# Patient Record
Sex: FEMALE | Race: BLACK OR AFRICAN AMERICAN | NOT HISPANIC OR LATINO
[De-identification: names, ages, dates, MRNs, and addresses within clinical notes are randomized per-mention and may not be internally consistent; named-entity substitution may affect disease eponyms.]

---

## 2019-03-13 ENCOUNTER — ASOB RESULT (OUTPATIENT)
Age: 34
End: 2019-03-13

## 2019-03-13 ENCOUNTER — APPOINTMENT (OUTPATIENT)
Age: 34
End: 2019-03-13
Payer: COMMERCIAL

## 2019-03-13 PROBLEM — Z00.00 ENCOUNTER FOR PREVENTIVE HEALTH EXAMINATION: Status: ACTIVE | Noted: 2019-03-13

## 2019-03-13 PROCEDURE — 76813 OB US NUCHAL MEAS 1 GEST: CPT

## 2021-01-14 ENCOUNTER — APPOINTMENT (OUTPATIENT)
Dept: ULTRASOUND IMAGING | Facility: IMAGING CENTER | Age: 36
End: 2021-01-14
Payer: COMMERCIAL

## 2021-01-14 ENCOUNTER — OUTPATIENT (OUTPATIENT)
Dept: OUTPATIENT SERVICES | Facility: HOSPITAL | Age: 36
LOS: 1 days | End: 2021-01-14
Payer: COMMERCIAL

## 2021-01-14 ENCOUNTER — APPOINTMENT (OUTPATIENT)
Dept: MAMMOGRAPHY | Facility: IMAGING CENTER | Age: 36
End: 2021-01-14
Payer: COMMERCIAL

## 2021-01-14 DIAGNOSIS — Z00.8 ENCOUNTER FOR OTHER GENERAL EXAMINATION: ICD-10-CM

## 2021-01-14 PROCEDURE — 77063 BREAST TOMOSYNTHESIS BI: CPT | Mod: 26

## 2021-01-14 PROCEDURE — 77067 SCR MAMMO BI INCL CAD: CPT | Mod: 26

## 2021-01-14 PROCEDURE — 76641 ULTRASOUND BREAST COMPLETE: CPT | Mod: 26,50

## 2021-01-14 PROCEDURE — 76641 ULTRASOUND BREAST COMPLETE: CPT

## 2021-01-14 PROCEDURE — 77063 BREAST TOMOSYNTHESIS BI: CPT

## 2021-01-14 PROCEDURE — 77067 SCR MAMMO BI INCL CAD: CPT

## 2021-02-25 ENCOUNTER — RESULT REVIEW (OUTPATIENT)
Age: 36
End: 2021-02-25

## 2021-09-09 ENCOUNTER — EMERGENCY (EMERGENCY)
Facility: HOSPITAL | Age: 36
LOS: 1 days | Discharge: HOME | End: 2021-09-09
Admitting: EMERGENCY MEDICINE
Payer: COMMERCIAL

## 2021-09-09 VITALS
TEMPERATURE: 97 F | SYSTOLIC BLOOD PRESSURE: 138 MMHG | OXYGEN SATURATION: 100 % | DIASTOLIC BLOOD PRESSURE: 74 MMHG | RESPIRATION RATE: 18 BRPM | HEART RATE: 75 BPM

## 2021-09-09 LAB
ALBUMIN SERPL ELPH-MCNC: 4.5 G/DL — SIGNIFICANT CHANGE UP (ref 3.3–5)
ALP SERPL-CCNC: 54 U/L — SIGNIFICANT CHANGE UP (ref 40–120)
ALT FLD-CCNC: 11 U/L — SIGNIFICANT CHANGE UP (ref 4–33)
ANION GAP SERPL CALC-SCNC: 14 MMOL/L — SIGNIFICANT CHANGE UP (ref 7–14)
APPEARANCE UR: ABNORMAL
APTT BLD: 29.1 SEC — SIGNIFICANT CHANGE UP (ref 27–36.3)
AST SERPL-CCNC: 15 U/L — SIGNIFICANT CHANGE UP (ref 4–32)
BACTERIA # UR AUTO: ABNORMAL
BASOPHILS # BLD AUTO: 0.04 K/UL — SIGNIFICANT CHANGE UP (ref 0–0.2)
BASOPHILS NFR BLD AUTO: 0.5 % — SIGNIFICANT CHANGE UP (ref 0–2)
BILIRUB SERPL-MCNC: <0.2 MG/DL — SIGNIFICANT CHANGE UP (ref 0.2–1.2)
BILIRUB UR-MCNC: NEGATIVE — SIGNIFICANT CHANGE UP
BLD GP AB SCN SERPL QL: NEGATIVE — SIGNIFICANT CHANGE UP
BUN SERPL-MCNC: 5 MG/DL — LOW (ref 7–23)
CALCIUM SERPL-MCNC: 9.5 MG/DL — SIGNIFICANT CHANGE UP (ref 8.4–10.5)
CHLORIDE SERPL-SCNC: 99 MMOL/L — SIGNIFICANT CHANGE UP (ref 98–107)
CO2 SERPL-SCNC: 22 MMOL/L — SIGNIFICANT CHANGE UP (ref 22–31)
COLOR SPEC: YELLOW — SIGNIFICANT CHANGE UP
CREAT SERPL-MCNC: 0.56 MG/DL — SIGNIFICANT CHANGE UP (ref 0.5–1.3)
DIFF PNL FLD: ABNORMAL
EOSINOPHIL # BLD AUTO: 0.21 K/UL — SIGNIFICANT CHANGE UP (ref 0–0.5)
EOSINOPHIL NFR BLD AUTO: 2.8 % — SIGNIFICANT CHANGE UP (ref 0–6)
EPI CELLS # UR: 11 /HPF — HIGH (ref 0–5)
GLUCOSE SERPL-MCNC: 91 MG/DL — SIGNIFICANT CHANGE UP (ref 70–99)
GLUCOSE UR QL: NEGATIVE — SIGNIFICANT CHANGE UP
HCG SERPL-ACNC: SIGNIFICANT CHANGE UP MIU/ML
HCT VFR BLD CALC: 33.9 % — LOW (ref 34.5–45)
HGB BLD-MCNC: 11.6 G/DL — SIGNIFICANT CHANGE UP (ref 11.5–15.5)
HYALINE CASTS # UR AUTO: 2 /LPF — SIGNIFICANT CHANGE UP (ref 0–7)
IANC: 3.52 K/UL — SIGNIFICANT CHANGE UP (ref 1.5–8.5)
IMM GRANULOCYTES NFR BLD AUTO: 0.3 % — SIGNIFICANT CHANGE UP (ref 0–1.5)
INR BLD: 1.02 RATIO — SIGNIFICANT CHANGE UP (ref 0.88–1.16)
KETONES UR-MCNC: NEGATIVE — SIGNIFICANT CHANGE UP
LEUKOCYTE ESTERASE UR-ACNC: NEGATIVE — SIGNIFICANT CHANGE UP
LYMPHOCYTES # BLD AUTO: 3.23 K/UL — SIGNIFICANT CHANGE UP (ref 1–3.3)
LYMPHOCYTES # BLD AUTO: 42.8 % — SIGNIFICANT CHANGE UP (ref 13–44)
MCHC RBC-ENTMCNC: 30 PG — SIGNIFICANT CHANGE UP (ref 27–34)
MCHC RBC-ENTMCNC: 34.2 GM/DL — SIGNIFICANT CHANGE UP (ref 32–36)
MCV RBC AUTO: 87.6 FL — SIGNIFICANT CHANGE UP (ref 80–100)
MONOCYTES # BLD AUTO: 0.53 K/UL — SIGNIFICANT CHANGE UP (ref 0–0.9)
MONOCYTES NFR BLD AUTO: 7 % — SIGNIFICANT CHANGE UP (ref 2–14)
NEUTROPHILS # BLD AUTO: 3.52 K/UL — SIGNIFICANT CHANGE UP (ref 1.8–7.4)
NEUTROPHILS NFR BLD AUTO: 46.6 % — SIGNIFICANT CHANGE UP (ref 43–77)
NITRITE UR-MCNC: NEGATIVE — SIGNIFICANT CHANGE UP
NRBC # BLD: 0 /100 WBCS — SIGNIFICANT CHANGE UP
NRBC # FLD: 0 K/UL — SIGNIFICANT CHANGE UP
PH UR: 7.5 — SIGNIFICANT CHANGE UP (ref 5–8)
PLATELET # BLD AUTO: 247 K/UL — SIGNIFICANT CHANGE UP (ref 150–400)
POTASSIUM SERPL-MCNC: 3.5 MMOL/L — SIGNIFICANT CHANGE UP (ref 3.5–5.3)
POTASSIUM SERPL-SCNC: 3.5 MMOL/L — SIGNIFICANT CHANGE UP (ref 3.5–5.3)
PROT SERPL-MCNC: 7.5 G/DL — SIGNIFICANT CHANGE UP (ref 6–8.3)
PROT UR-MCNC: ABNORMAL
PROTHROM AB SERPL-ACNC: 11.7 SEC — SIGNIFICANT CHANGE UP (ref 10.6–13.6)
RBC # BLD: 3.87 M/UL — SIGNIFICANT CHANGE UP (ref 3.8–5.2)
RBC # FLD: 12.2 % — SIGNIFICANT CHANGE UP (ref 10.3–14.5)
RBC CASTS # UR COMP ASSIST: 1 /HPF — SIGNIFICANT CHANGE UP (ref 0–4)
RH IG SCN BLD-IMP: POSITIVE — SIGNIFICANT CHANGE UP
SODIUM SERPL-SCNC: 135 MMOL/L — SIGNIFICANT CHANGE UP (ref 135–145)
SP GR SPEC: 1.02 — SIGNIFICANT CHANGE UP (ref 1–1.05)
UROBILINOGEN FLD QL: SIGNIFICANT CHANGE UP
WBC # BLD: 7.55 K/UL — SIGNIFICANT CHANGE UP (ref 3.8–10.5)
WBC # FLD AUTO: 7.55 K/UL — SIGNIFICANT CHANGE UP (ref 3.8–10.5)
WBC UR QL: 12 /HPF — HIGH (ref 0–5)

## 2021-09-09 PROCEDURE — 99284 EMERGENCY DEPT VISIT MOD MDM: CPT

## 2021-09-09 PROCEDURE — 76817 TRANSVAGINAL US OBSTETRIC: CPT | Mod: 26

## 2021-09-09 RX ORDER — ACETAMINOPHEN 500 MG
975 TABLET ORAL ONCE
Refills: 0 | Status: COMPLETED | OUTPATIENT
Start: 2021-09-09 | End: 2021-09-09

## 2021-09-09 RX ORDER — LIDOCAINE 4 G/100G
1 CREAM TOPICAL ONCE
Refills: 0 | Status: COMPLETED | OUTPATIENT
Start: 2021-09-09 | End: 2021-09-09

## 2021-09-09 RX ADMIN — Medication 975 MILLIGRAM(S): at 21:07

## 2021-09-09 RX ADMIN — LIDOCAINE 1 PATCH: 4 CREAM TOPICAL at 21:07

## 2021-09-09 NOTE — ED PROVIDER NOTE - NSFOLLOWUPINSTRUCTIONS_ED_ALL_ED_FT
please follow with your OB doctor within next 2-3 days.     Threatened Miscarriage    A threatened miscarriage is the term for vaginal bleeding during your first 20 weeks of pregnancy but the pregnancy has not ended. If you have vaginal bleeding during this time, your health care provider will do tests to check if you are still pregnant. If the tests show you are still pregnant and the developing baby (fetus) inside your womb (uterus) is still growing, your condition is considered a threatened miscarriage. A threatened miscarriage does not mean your pregnancy will end, but it does increase the risk of losing your pregnancy. It is important to have close follow up with your obstetrician.    SEEK IMMEDIATE MEDICAL CARE IF YOU HAVE ANY OF THE FOLLOWING SYMPTOMS: heavy vaginal bleeding, severe low back or abdominal cramps, fever/chills, or lightheadedness/dizziness.

## 2021-09-09 NOTE — ED PROVIDER NOTE - PATIENT PORTAL LINK FT
You can access the FollowMyHealth Patient Portal offered by [facility] by registering at the following website: [email or web address]. By joining [facility]’s FollowMyHealth portal, you will also be able to view your health information using other applications (apps) compatible with our system.

## 2021-09-09 NOTE — ED PROVIDER NOTE - CLINICAL SUMMARY MEDICAL DECISION MAKING FREE TEXT BOX
34 y/o F, , currently 10 wks pregnant presenting with vaginal bleeding x today. symptoms concerning for threaten  vs active miscarriage. plan for routine labs, TVUS, gyn consults pending results

## 2021-09-09 NOTE — ED PROVIDER NOTE - PROGRESS NOTE DETAILS
ARIELLE Uribe: TVUS sono results still pending. [place] resident provided a prelimary read of viable pregnancy, , and subchorionic hematoma. patient informed of findings. patient will like to be discharge, stating that is comfortable leaving without official report and her OB is affiliated with [facility] and can view results. return precautions discussed.

## 2021-09-09 NOTE — ED PROVIDER NOTE - OBJECTIVE STATEMENT
patient is a 34 y/o F,  currently 10 wks pregnant presenting for eval of vaginal bleeding x toady. She reports bright red blood. patient currently being follow of Dr. Tabor. She was advised to follow to ED to rule out active miscarriage.

## 2021-09-09 NOTE — ED ADULT NURSE NOTE - OBJECTIVE STATEMENT
A&Ox4, . Pt. states that she had starting getting back pain and vaginal bleeding today and was told to come to the ER d/t her having a miscarriage. Pt. reports being 6 weeks pregnant. VSS.

## 2021-09-09 NOTE — ED ADULT TRIAGE NOTE - CHIEF COMPLAINT QUOTE
Arrives from home reports sudden onset of bright red vaginal bleeding since 5:45PM today a/w lower back cramping. Is in first trimester of pregnancy. Denies blood thinner use or PMH

## 2021-09-10 VITALS
HEART RATE: 74 BPM | SYSTOLIC BLOOD PRESSURE: 112 MMHG | OXYGEN SATURATION: 100 % | TEMPERATURE: 98 F | RESPIRATION RATE: 16 BRPM | DIASTOLIC BLOOD PRESSURE: 72 MMHG

## 2021-09-10 NOTE — ED ADULT NURSE REASSESSMENT NOTE - TEMPERATURE IN FAHRENHEIT (DEGREES F)
Per mom, patient presents with projectile like vomiting post feeds for the least few weeks increasing in amount and frequency over the last two days. Good po intake and uo. Wet diaper in triage. Born Full term, no complications. Currently, patient is awake and alert. Abdomen soft and non distended. BP unable to be obtained due to pt. crying. BCR noted apical HR auscultated 98.2

## 2021-09-10 NOTE — ED ADULT NURSE REASSESSMENT NOTE - NS ED NURSE REASSESS COMMENT FT1
A&Ox4. ambulatory. NAD. pt denies pain, dizziness, sob, cp, nausea, chills. respirations are even and un labored.  safety precautions maintained. will continue to monitor.

## 2021-09-21 ENCOUNTER — APPOINTMENT (OUTPATIENT)
Dept: ANTEPARTUM | Facility: CLINIC | Age: 36
End: 2021-09-21
Payer: COMMERCIAL

## 2021-09-21 PROBLEM — Z78.9 OTHER SPECIFIED HEALTH STATUS: Chronic | Status: ACTIVE | Noted: 2021-09-09

## 2021-09-21 PROCEDURE — 36416 COLLJ CAPILLARY BLOOD SPEC: CPT

## 2021-09-21 PROCEDURE — 76813 OB US NUCHAL MEAS 1 GEST: CPT

## 2021-09-21 PROCEDURE — 76801 OB US < 14 WKS SINGLE FETUS: CPT

## 2021-10-28 ENCOUNTER — APPOINTMENT (OUTPATIENT)
Dept: MATERNAL FETAL MEDICINE | Facility: CLINIC | Age: 36
End: 2021-10-28
Payer: COMMERCIAL

## 2021-10-28 ENCOUNTER — ASOB RESULT (OUTPATIENT)
Age: 36
End: 2021-10-28

## 2021-10-28 PROCEDURE — 99202 OFFICE O/P NEW SF 15 MIN: CPT | Mod: 95

## 2021-11-09 ENCOUNTER — ASOB RESULT (OUTPATIENT)
Age: 36
End: 2021-11-09

## 2021-11-09 ENCOUNTER — APPOINTMENT (OUTPATIENT)
Dept: ANTEPARTUM | Facility: CLINIC | Age: 36
End: 2021-11-09
Payer: COMMERCIAL

## 2021-11-09 PROCEDURE — 76811 OB US DETAILED SNGL FETUS: CPT

## 2022-04-07 ENCOUNTER — INPATIENT (INPATIENT)
Facility: HOSPITAL | Age: 37
LOS: 1 days | Discharge: HOME | End: 2022-04-09
Attending: OBSTETRICS & GYNECOLOGY | Admitting: OBSTETRICS & GYNECOLOGY
Payer: COMMERCIAL

## 2022-04-07 ENCOUNTER — RESULT REVIEW (OUTPATIENT)
Age: 37
End: 2022-04-07

## 2022-04-07 ENCOUNTER — TRANSCRIPTION ENCOUNTER (OUTPATIENT)
Age: 37
End: 2022-04-07

## 2022-04-07 VITALS
HEART RATE: 96 BPM | TEMPERATURE: 99 F | SYSTOLIC BLOOD PRESSURE: 144 MMHG | DIASTOLIC BLOOD PRESSURE: 81 MMHG | RESPIRATION RATE: 16 BRPM

## 2022-04-07 DIAGNOSIS — Z3A.00 WEEKS OF GESTATION OF PREGNANCY NOT SPECIFIED: ICD-10-CM

## 2022-04-07 DIAGNOSIS — O26.899 OTHER SPECIFIED PREGNANCY RELATED CONDITIONS, UNSPECIFIED TRIMESTER: ICD-10-CM

## 2022-04-07 LAB
ALBUMIN SERPL ELPH-MCNC: 3.4 G/DL — SIGNIFICANT CHANGE UP (ref 3.3–5)
ALP SERPL-CCNC: 181 U/L — HIGH (ref 40–120)
ALT FLD-CCNC: 12 U/L — SIGNIFICANT CHANGE UP (ref 4–33)
ANION GAP SERPL CALC-SCNC: 14 MMOL/L — SIGNIFICANT CHANGE UP (ref 7–14)
APPEARANCE UR: ABNORMAL
APTT BLD: 26.7 SEC — LOW (ref 27–36.3)
AST SERPL-CCNC: 17 U/L — SIGNIFICANT CHANGE UP (ref 4–32)
BACTERIA # UR AUTO: ABNORMAL
BASOPHILS # BLD AUTO: 0.03 K/UL — SIGNIFICANT CHANGE UP (ref 0–0.2)
BASOPHILS NFR BLD AUTO: 0.3 % — SIGNIFICANT CHANGE UP (ref 0–2)
BILIRUB SERPL-MCNC: <0.2 MG/DL — SIGNIFICANT CHANGE UP (ref 0.2–1.2)
BILIRUB UR-MCNC: NEGATIVE — SIGNIFICANT CHANGE UP
BLD GP AB SCN SERPL QL: NEGATIVE — SIGNIFICANT CHANGE UP
BUN SERPL-MCNC: 6 MG/DL — LOW (ref 7–23)
CALCIUM SERPL-MCNC: 9.1 MG/DL — SIGNIFICANT CHANGE UP (ref 8.4–10.5)
CHLORIDE SERPL-SCNC: 102 MMOL/L — SIGNIFICANT CHANGE UP (ref 98–107)
CO2 SERPL-SCNC: 17 MMOL/L — LOW (ref 22–31)
COD CRY URNS QL: ABNORMAL
COLOR SPEC: YELLOW — SIGNIFICANT CHANGE UP
COMMENT - URINE: SIGNIFICANT CHANGE UP
COVID-19 SPIKE DOMAIN AB INTERP: POSITIVE
COVID-19 SPIKE DOMAIN ANTIBODY RESULT: >250 U/ML — HIGH
CREAT ?TM UR-MCNC: 255 MG/DL — SIGNIFICANT CHANGE UP
CREAT SERPL-MCNC: 0.57 MG/DL — SIGNIFICANT CHANGE UP (ref 0.5–1.3)
DIFF PNL FLD: ABNORMAL
EGFR: 119 ML/MIN/1.73M2 — SIGNIFICANT CHANGE UP
EOSINOPHIL # BLD AUTO: 0.13 K/UL — SIGNIFICANT CHANGE UP (ref 0–0.5)
EOSINOPHIL NFR BLD AUTO: 1.3 % — SIGNIFICANT CHANGE UP (ref 0–6)
EPI CELLS # UR: 12 /HPF — HIGH (ref 0–5)
FIBRINOGEN PPP-MCNC: 720 MG/DL — HIGH (ref 330–520)
GLUCOSE SERPL-MCNC: 101 MG/DL — HIGH (ref 70–99)
GLUCOSE UR QL: NEGATIVE — SIGNIFICANT CHANGE UP
HCT VFR BLD CALC: 29.1 % — LOW (ref 34.5–45)
HCT VFR BLD CALC: 31.1 % — LOW (ref 34.5–45)
HGB BLD-MCNC: 10 G/DL — LOW (ref 11.5–15.5)
HGB BLD-MCNC: 9.3 G/DL — LOW (ref 11.5–15.5)
HYALINE CASTS # UR AUTO: 3 /LPF — SIGNIFICANT CHANGE UP (ref 0–7)
IANC: 6.34 K/UL — SIGNIFICANT CHANGE UP (ref 1.8–7.4)
IMM GRANULOCYTES NFR BLD AUTO: 1.3 % — SIGNIFICANT CHANGE UP (ref 0–1.5)
INR BLD: 0.97 RATIO — SIGNIFICANT CHANGE UP (ref 0.88–1.16)
KETONES UR-MCNC: ABNORMAL
LDH SERPL L TO P-CCNC: 179 U/L — SIGNIFICANT CHANGE UP (ref 135–225)
LEUKOCYTE ESTERASE UR-ACNC: NEGATIVE — SIGNIFICANT CHANGE UP
LYMPHOCYTES # BLD AUTO: 2.29 K/UL — SIGNIFICANT CHANGE UP (ref 1–3.3)
LYMPHOCYTES # BLD AUTO: 23.4 % — SIGNIFICANT CHANGE UP (ref 13–44)
MCHC RBC-ENTMCNC: 27.4 PG — SIGNIFICANT CHANGE UP (ref 27–34)
MCHC RBC-ENTMCNC: 27.6 PG — SIGNIFICANT CHANGE UP (ref 27–34)
MCHC RBC-ENTMCNC: 32 GM/DL — SIGNIFICANT CHANGE UP (ref 32–36)
MCHC RBC-ENTMCNC: 32.2 GM/DL — SIGNIFICANT CHANGE UP (ref 32–36)
MCV RBC AUTO: 85.6 FL — SIGNIFICANT CHANGE UP (ref 80–100)
MCV RBC AUTO: 85.9 FL — SIGNIFICANT CHANGE UP (ref 80–100)
MONOCYTES # BLD AUTO: 0.88 K/UL — SIGNIFICANT CHANGE UP (ref 0–0.9)
MONOCYTES NFR BLD AUTO: 9 % — SIGNIFICANT CHANGE UP (ref 2–14)
NEUTROPHILS # BLD AUTO: 6.34 K/UL — SIGNIFICANT CHANGE UP (ref 1.8–7.4)
NEUTROPHILS NFR BLD AUTO: 64.7 % — SIGNIFICANT CHANGE UP (ref 43–77)
NITRITE UR-MCNC: NEGATIVE — SIGNIFICANT CHANGE UP
NRBC # BLD: 0 /100 WBCS — SIGNIFICANT CHANGE UP
NRBC # BLD: 0 /100 WBCS — SIGNIFICANT CHANGE UP
NRBC # FLD: 0 K/UL — SIGNIFICANT CHANGE UP
NRBC # FLD: 0 K/UL — SIGNIFICANT CHANGE UP
PH UR: 6.5 — SIGNIFICANT CHANGE UP (ref 5–8)
PLATELET # BLD AUTO: 216 K/UL — SIGNIFICANT CHANGE UP (ref 150–400)
PLATELET # BLD AUTO: 233 K/UL — SIGNIFICANT CHANGE UP (ref 150–400)
POTASSIUM SERPL-MCNC: 4 MMOL/L — SIGNIFICANT CHANGE UP (ref 3.5–5.3)
POTASSIUM SERPL-SCNC: 4 MMOL/L — SIGNIFICANT CHANGE UP (ref 3.5–5.3)
PROT ?TM UR-MCNC: 123 MG/DL — SIGNIFICANT CHANGE UP
PROT ?TM UR-MCNC: 123 MG/DL — SIGNIFICANT CHANGE UP
PROT SERPL-MCNC: 6.6 G/DL — SIGNIFICANT CHANGE UP (ref 6–8.3)
PROT UR-MCNC: ABNORMAL
PROT/CREAT UR-RTO: 0.5 RATIO — HIGH (ref 0–0.2)
PROTHROM AB SERPL-ACNC: 11.2 SEC — SIGNIFICANT CHANGE UP (ref 10.5–13.4)
RBC # BLD: 3.4 M/UL — LOW (ref 3.8–5.2)
RBC # BLD: 3.62 M/UL — LOW (ref 3.8–5.2)
RBC # FLD: 16.4 % — HIGH (ref 10.3–14.5)
RBC # FLD: 16.5 % — HIGH (ref 10.3–14.5)
RBC CASTS # UR COMP ASSIST: 27 /HPF — HIGH (ref 0–4)
RH IG SCN BLD-IMP: POSITIVE — SIGNIFICANT CHANGE UP
RH IG SCN BLD-IMP: POSITIVE — SIGNIFICANT CHANGE UP
SARS-COV-2 IGG+IGM SERPL QL IA: >250 U/ML — HIGH
SARS-COV-2 IGG+IGM SERPL QL IA: POSITIVE
SARS-COV-2 RNA SPEC QL NAA+PROBE: SIGNIFICANT CHANGE UP
SODIUM SERPL-SCNC: 133 MMOL/L — LOW (ref 135–145)
SP GR SPEC: 1.03 — SIGNIFICANT CHANGE UP (ref 1–1.05)
T PALLIDUM AB TITR SER: NEGATIVE — SIGNIFICANT CHANGE UP
URATE SERPL-MCNC: 2.8 MG/DL — SIGNIFICANT CHANGE UP (ref 2.5–7)
UROBILINOGEN FLD QL: SIGNIFICANT CHANGE UP
WBC # BLD: 12.84 K/UL — HIGH (ref 3.8–10.5)
WBC # BLD: 9.8 K/UL — SIGNIFICANT CHANGE UP (ref 3.8–10.5)
WBC # FLD AUTO: 12.84 K/UL — HIGH (ref 3.8–10.5)
WBC # FLD AUTO: 9.8 K/UL — SIGNIFICANT CHANGE UP (ref 3.8–10.5)
WBC UR QL: 5 /HPF — SIGNIFICANT CHANGE UP (ref 0–5)

## 2022-04-07 PROCEDURE — 88307 TISSUE EXAM BY PATHOLOGIST: CPT | Mod: 26

## 2022-04-07 RX ORDER — OXYTOCIN 10 UNIT/ML
2 VIAL (ML) INJECTION
Qty: 30 | Refills: 0 | Status: DISCONTINUED | OUTPATIENT
Start: 2022-04-07 | End: 2022-04-09

## 2022-04-07 RX ORDER — GENTAMICIN SULFATE 40 MG/ML
400 VIAL (ML) INJECTION ONCE
Refills: 0 | Status: COMPLETED | OUTPATIENT
Start: 2022-04-07 | End: 2022-04-07

## 2022-04-07 RX ORDER — SODIUM CHLORIDE 9 MG/ML
500 INJECTION, SOLUTION INTRAVENOUS ONCE
Refills: 0 | Status: COMPLETED | OUTPATIENT
Start: 2022-04-07 | End: 2022-04-07

## 2022-04-07 RX ORDER — AMPICILLIN TRIHYDRATE 250 MG
CAPSULE ORAL
Refills: 0 | Status: DISCONTINUED | OUTPATIENT
Start: 2022-04-07 | End: 2022-04-07

## 2022-04-07 RX ORDER — SODIUM CHLORIDE 9 MG/ML
1000 INJECTION, SOLUTION INTRAVENOUS
Refills: 0 | Status: DISCONTINUED | OUTPATIENT
Start: 2022-04-07 | End: 2022-04-08

## 2022-04-07 RX ORDER — ACETAMINOPHEN 500 MG
1000 TABLET ORAL ONCE
Refills: 0 | Status: COMPLETED | OUTPATIENT
Start: 2022-04-07 | End: 2022-04-07

## 2022-04-07 RX ORDER — SODIUM CHLORIDE 9 MG/ML
500 INJECTION, SOLUTION INTRAVENOUS ONCE
Refills: 0 | Status: DISCONTINUED | OUTPATIENT
Start: 2022-04-07 | End: 2022-04-09

## 2022-04-07 RX ORDER — ACETAMINOPHEN 500 MG
975 TABLET ORAL ONCE
Refills: 0 | Status: COMPLETED | OUTPATIENT
Start: 2022-04-07 | End: 2022-04-07

## 2022-04-07 RX ORDER — AMPICILLIN TRIHYDRATE 250 MG
2 CAPSULE ORAL ONCE
Refills: 0 | Status: DISCONTINUED | OUTPATIENT
Start: 2022-04-07 | End: 2022-04-07

## 2022-04-07 RX ORDER — OXYTOCIN 10 UNIT/ML
4 VIAL (ML) INJECTION
Qty: 30 | Refills: 0 | Status: DISCONTINUED | OUTPATIENT
Start: 2022-04-07 | End: 2022-04-09

## 2022-04-07 RX ORDER — OXYTOCIN 10 UNIT/ML
333.33 VIAL (ML) INJECTION
Qty: 20 | Refills: 0 | Status: DISCONTINUED | OUTPATIENT
Start: 2022-04-07 | End: 2022-04-08

## 2022-04-07 RX ORDER — AMPICILLIN TRIHYDRATE 250 MG
2 CAPSULE ORAL ONCE
Refills: 0 | Status: COMPLETED | OUTPATIENT
Start: 2022-04-07 | End: 2022-04-07

## 2022-04-07 RX ORDER — AMPICILLIN TRIHYDRATE 250 MG
2 CAPSULE ORAL EVERY 6 HOURS
Refills: 0 | Status: DISCONTINUED | OUTPATIENT
Start: 2022-04-08 | End: 2022-04-08

## 2022-04-07 RX ADMIN — Medication 216 GRAM(S): at 22:15

## 2022-04-07 RX ADMIN — Medication 216 GRAM(S): at 16:45

## 2022-04-07 RX ADMIN — Medication 975 MILLIGRAM(S): at 16:45

## 2022-04-07 RX ADMIN — Medication 500 MILLIGRAM(S): at 17:47

## 2022-04-07 RX ADMIN — Medication 975 MILLIGRAM(S): at 17:33

## 2022-04-07 RX ADMIN — Medication 2 MILLIUNIT(S)/MIN: at 08:42

## 2022-04-07 RX ADMIN — SODIUM CHLORIDE 125 MILLILITER(S): 9 INJECTION, SOLUTION INTRAVENOUS at 17:29

## 2022-04-07 RX ADMIN — SODIUM CHLORIDE 1000 MILLILITER(S): 9 INJECTION, SOLUTION INTRAVENOUS at 17:29

## 2022-04-07 RX ADMIN — SODIUM CHLORIDE 125 MILLILITER(S): 9 INJECTION, SOLUTION INTRAVENOUS at 08:43

## 2022-04-07 RX ADMIN — Medication 400 MILLIGRAM(S): at 22:00

## 2022-04-07 NOTE — OB RN TRIAGE NOTE - FALL HARM RISK - UNIVERSAL INTERVENTIONS
Bed in lowest position, wheels locked, appropriate side rails in place/Call bell, personal items and telephone in reach/Instruct patient to call for assistance before getting out of bed or chair/Non-slip footwear when patient is out of bed/[place] to call system/Physically safe environment - no spills, clutter or unnecessary equipment/Purposeful Proactive Rounding/Room/bathroom lighting operational, light cord in reach

## 2022-04-07 NOTE — OB RN PATIENT PROFILE - LIMIT VISITORS, INFANT PROFILE
[facility] EMERGENCY MEDICAL [place] [facility] Case Management Initial Discharge Assessment    Met with caregiver and spoke by phone with pt's jacinto/POA  to discuss discharge plan. PCP: Elsa Florence MD                                Date of Last Visit: last week    If no PCP, list provided? N/A    Discharge Planning    Living Arrangements: in a senior living apartment. Who do you live with? self    Who helps you with your care:  private caregiver she has 24 hour care with private hire. Not associated with any company caregiver states. Jacinto/MISSY Vickers does help. If lives at home:     Do you have any barriers navigating in your home? No, she has a transfer chair and a walker and is assisted by caregivers    Patient can perform ADL? No, caregivers help her    Current Services (outpatient and in home) :  Private Hire Help/Aides (Company no company associations per caregiver.)    Dialysis: No    Is transportation available to get to your appointments? Yes    DME Equipment:  yes - transfer chair, walker    Respiratory equipment: None    Respiratory provider:  no     [facility]  [facility]    Consult with Medication Assistance Program?  No        Does Patient Have a High-Risk for Readmission Diagnosis (CHF, PN, MI, COPD)? No    Initial Discharge Plan? (Note: please see concurrent daily documentation for any updates after initial note). Pt's jacinto Vickers is stating interest in [facility] for rehab.  Further d/c needs and referrals to be assessed by CM    Electronically signed by Adina Guy on 1/18/2020 at 3:15 PM no

## 2022-04-07 NOTE — OB PROVIDER LABOR PROGRESS NOTE - ASSESSMENT
Cervical change noted  Irregular contraction pattern  Plan for pitocin 2x2  Will reassess PRN      donald John NP

## 2022-04-07 NOTE — OB PROVIDER LABOR PROGRESS NOTE - NS_SUBJECTIVE/OBJECTIVE_OBGYN_ALL_OB_FT
Patient examined due to 1 minute deceleration. Patient was repositioned and tracing was resuscitated. Patient tolerated exam well. Peanut ball was replaced.
Patient seen and evaluated at bedside for cervical exam. Patient comfortable, however, states that she feels warm.    Vital Signs Last 24 Hrs  T(C): 38.4 (07 Apr 2022 16:34), Max: 38.4 (07 Apr 2022 16:34)  T(F): 101.12 (07 Apr 2022 16:34), Max: 101.12 (07 Apr 2022 16:34)  HR: 120 (07 Apr 2022 16:55) (75 - 124)  BP: 153/64 (07 Apr 2022 16:54) (106/58 - 181/95)  BP(mean): --  RR: 16 (07 Apr 2022 06:15) (16 - 16)  SpO2: 99% (07 Apr 2022 16:55) (91% - 100%)
Patient seen and examined for vaginal exam. Comfortable with epidural. No complaints at this time.  VS  T(C): 37 (04-07-22 @ 06:15)  HR: 94 (04-07-22 @ 08:15)  BP: 125/79 (04-07-22 @ 08:08)  RR: 16 (04-07-22 @ 06:15)  SpO2: 99% (04-07-22 @ 08:15)

## 2022-04-07 NOTE — OB PROVIDER H&P - ASSESSMENT
35 y/o pt 40.4 weeks  presents in labor for pain management   d/w Dr. Diggs   Plan:  -Admit l&d. Routine labs  -Expectant management of labor  -HELLP labs pending   -Fetus: cat 1 tracing, vertex presentation, continuous monitoring   -GBS negative  -Pain: patient desires epidural  -Covid 19 pending for patient  -Consents signed and witnessed at bedside

## 2022-04-07 NOTE — PROGRESS NOTE ADULT - SUBJECTIVE AND OBJECTIVE BOX
Attending note   patient evaluated since 1950 pm and noted FHRt category 2 tracing that resolved with discontinuation of Pitocin and change in position   once category one tracing restore Pitocin started at lower dose since contractions were irregular  noted progression to 9.5 cm at around 9 pm   Fever noted and fetal tachycardia noted that resolved s/p hydration Tylenol and antibiotics   Plan- to reexamine now for progression and descend  Attending note   patient evaluated ,multiple times, since 1950 pm and noted FHRT at category 1 with intermittent episodes of category 2 tracing resolved as a response to discontinuation of Pitocin and change in position   once category one tracing restore Pitocin started at lower dose since contractions were irregular  noted progression to 9.5 cm at around 9 pm   Fever noted and fetal tachycardia noted that resolved s/p hydration Tylenol and antibiotics   Plan- to reexamine now for progression and descend  C Marc

## 2022-04-07 NOTE — OB RN DELIVERY SUMMARY - NS_SEPSISRSKCALC_OBGYN_ALL_OB_FT
EOS calculated successfully. EOS Risk Factor: 0.5/1000 live births ([facility] national incidence); GA=40w4d; Temp=101.48; ROM=12.083; GBS='Negative'; Antibiotics='Broad spectrum antibiotics > 4 hrs prior to birth'

## 2022-04-07 NOTE — OB PROVIDER LABOR PROGRESS NOTE - ASSESSMENT
37 y/o  @40.4w PNC c/b PEC     -HELLP wnl P/C:0.5  -Cervical change noted  -continue pitocin   -Will reassess PRN    d/w Dr. Han-Salinas Vaz, PGY1

## 2022-04-07 NOTE — OB PROVIDER H&P - HISTORY OF PRESENT ILLNESS
35 y/o pt 40.4 weeks   presents to triage with c/o worsening contractions. pt reports 10/10 pain with contraction. pt denies any lof or bleeding. pt denies n/v/d, fever or chills. pt endorses +fetal movement. pt denies any hx of elevated blood pressures, pt denies headache, epigastric pain or visual disturbances  Ap uncomplicated thus far    NKDA  PMH: denies  PSH: d&c x1  OB:  SAB x1 incomplete d&c  GYN:   denies fibroids/cysts/stds  Social hx:  denies etoh/smoking/illicit drugs  anxiety&depression- no meds, f/u with therapist  Medications:  PNV

## 2022-04-07 NOTE — OB PROVIDER H&P - PROBLEM SELECTOR PLAN 1
-Admit l&d. Routine labs  -Expectant management of labor  -HELLP labs pending   -Fetus: cat 1 tracing, vertex presentation, continuous monitoring   -GBS negative  -Pain: patient desires epidural  -Covid 19 pending for patient  -Consents signed and witnessed at bedside

## 2022-04-07 NOTE — OB PROVIDER H&P - NSHPPHYSICALEXAM_GEN_ALL_CORE
Vital Signs Last 24 Hrs  T(C): 37.3 (07 Apr 2022 05:07), Max: 37.3 (07 Apr 2022 05:07)  T(F): 99.1 (07 Apr 2022 05:07), Max: 99.1 (07 Apr 2022 05:07)  HR: 106 (07 Apr 2022 05:53) (93 - 106)  BP: 137/82 (07 Apr 2022 05:53) (137/82 - 156/85)  BP(mean): --  RR: 16 (07 Apr 2022 05:07) (16 - 16)  SpO2: --    Abdomen: soft, non tender. no guarding or rebound tenderness  SVE: 3/80/-3  TAS: vertex presentation  EFW 3317gm  by leopold's    NST in progress

## 2022-04-07 NOTE — CHART NOTE - NSCHARTNOTEFT_GEN_A_CORE
Attending Note     Pt thinks she broke her water    AFVSS  Gen in NAD   Abd soft, gravid  ExT: no c/c/e     SVE 6/80/-3, AROMed for meconium   FHTS 125 mod robi no decels + accels   TOCO q 2-4 min     A/P: nullip at 40 + weeks here in labor, pre-eclampsia without severe features  continue pitocin/peanut ball   reviewed pre-eclampsia diagnosis   pt is aware peds will present for delivery     LOVE Celis MD Spoke with Patients mother, she has taken Owynn in this morning to [facility] and he was diagnosed with Influenza A, he was prescribed tamiflu to help recovery. Mother states other siblings are symptomatic and is requesting tamiflu for siblings.  Opening separate encounter in siblings chart regarding this.       FYI ONLY

## 2022-04-07 NOTE — OB PROVIDER LABOR PROGRESS NOTE - ASSESSMENT
35yo  @40+4 admitted in labor. Patient stable and progressing well on augmentation with pitocin. However, FHR demonstrates fetal tachycardia. Patient now febrile to 38.4C and tachycardic to 110-120s.     Plan  - Start Ampicilin/Gentamicin for chorioamnionitis (maternal temp, fetal tachycardia, maternal tachycardia)  - Continue to monitor HR and trend fever curve  - Tylenol for antipyresis  - Patient met criteria for PEC during admission, P/C 0.5, HELLP labs wnl, continue to monitor BPs  - Continue augmentation with pitoci  - Epidural effective 35yo  @40+4 admitted in labor. Patient stable and progressing well on augmentation with pitocin. However, FHR demonstrates fetal tachycardia. Patient now febrile to 38.4C and tachycardic to 110-120s.     Plan  - Start Ampicilin/Gentamicin for chorioamnionitis (maternal temp, fetal tachycardia, maternal tachycardia)  - Continue to monitor HR and trend fever curve  - Tylenol for antipyresis  - Patient met criteria for PEC during admission, P/C 0.5, HELLP labs wnl, continue to monitor BPs  - Continue augmentation with pitoci  - Epidural effective    d/w Dr. Marc Irwin PGY2 35yo  @40+4 admitted in labor. Patient stable and progressing well on augmentation with pitocin. However, FHR demonstrates fetal tachycardia. Patient now febrile to 38.4C and tachycardic to 110-120s.     Plan  - Start Ampicilin/Gentamicin for chorioamnionitis (maternal temp, fetal tachycardia, maternal tachycardia)  - Continue to monitor HR and trend fever curve  - Tylenol for antipyresis  - Patient met criteria for PEC during admission, P/C 0.5, HELLP labs wnl, continue to monitor BPs  - Continue augmentation with pitoci  - Epidural effective    d/w Dr. Marc Irwin PGY2        Attending note   agree with assessment and plan of management XIOMY Tran

## 2022-04-08 LAB
HCT VFR BLD CALC: 25.7 % — LOW (ref 34.5–45)
HGB BLD-MCNC: 8.4 G/DL — LOW (ref 11.5–15.5)
MCHC RBC-ENTMCNC: 28.3 PG — SIGNIFICANT CHANGE UP (ref 27–34)
MCHC RBC-ENTMCNC: 32.7 GM/DL — SIGNIFICANT CHANGE UP (ref 32–36)
MCV RBC AUTO: 86.5 FL — SIGNIFICANT CHANGE UP (ref 80–100)
NRBC # BLD: 0 /100 WBCS — SIGNIFICANT CHANGE UP
NRBC # FLD: 0 K/UL — SIGNIFICANT CHANGE UP
PLATELET # BLD AUTO: 202 K/UL — SIGNIFICANT CHANGE UP (ref 150–400)
RBC # BLD: 2.97 M/UL — LOW (ref 3.8–5.2)
RBC # FLD: 16.8 % — HIGH (ref 10.3–14.5)
WBC # BLD: 18.2 K/UL — HIGH (ref 3.8–10.5)
WBC # FLD AUTO: 18.2 K/UL — HIGH (ref 3.8–10.5)

## 2022-04-08 RX ORDER — ERTAPENEM SODIUM 1 G/1
1000 INJECTION, POWDER, LYOPHILIZED, FOR SOLUTION INTRAMUSCULAR; INTRAVENOUS EVERY 24 HOURS
Refills: 0 | Status: DISCONTINUED | OUTPATIENT
Start: 2022-04-09 | End: 2022-04-09

## 2022-04-08 RX ORDER — IBUPROFEN 200 MG
600 TABLET ORAL EVERY 6 HOURS
Refills: 0 | Status: COMPLETED | OUTPATIENT
Start: 2022-04-08 | End: 2023-03-07

## 2022-04-08 RX ORDER — ERTAPENEM SODIUM 1 G/1
1000 INJECTION, POWDER, LYOPHILIZED, FOR SOLUTION INTRAMUSCULAR; INTRAVENOUS ONCE
Refills: 0 | Status: COMPLETED | OUTPATIENT
Start: 2022-04-08 | End: 2022-04-08

## 2022-04-08 RX ORDER — DIPHENHYDRAMINE HCL 50 MG
25 CAPSULE ORAL EVERY 6 HOURS
Refills: 0 | Status: DISCONTINUED | OUTPATIENT
Start: 2022-04-08 | End: 2022-04-09

## 2022-04-08 RX ORDER — ACETAMINOPHEN 500 MG
975 TABLET ORAL
Refills: 0 | Status: DISCONTINUED | OUTPATIENT
Start: 2022-04-08 | End: 2022-04-09

## 2022-04-08 RX ORDER — SODIUM CHLORIDE 9 MG/ML
3 INJECTION INTRAMUSCULAR; INTRAVENOUS; SUBCUTANEOUS EVERY 8 HOURS
Refills: 0 | Status: DISCONTINUED | OUTPATIENT
Start: 2022-04-08 | End: 2022-04-09

## 2022-04-08 RX ORDER — SIMETHICONE 80 MG/1
80 TABLET, CHEWABLE ORAL EVERY 4 HOURS
Refills: 0 | Status: DISCONTINUED | OUTPATIENT
Start: 2022-04-08 | End: 2022-04-09

## 2022-04-08 RX ORDER — HYDROCORTISONE 1 %
1 OINTMENT (GRAM) TOPICAL EVERY 6 HOURS
Refills: 0 | Status: DISCONTINUED | OUTPATIENT
Start: 2022-04-08 | End: 2022-04-09

## 2022-04-08 RX ORDER — IBUPROFEN 200 MG
600 TABLET ORAL EVERY 6 HOURS
Refills: 0 | Status: DISCONTINUED | OUTPATIENT
Start: 2022-04-08 | End: 2022-04-09

## 2022-04-08 RX ORDER — KETOROLAC TROMETHAMINE 30 MG/ML
30 SYRINGE (ML) INJECTION ONCE
Refills: 0 | Status: DISCONTINUED | OUTPATIENT
Start: 2022-04-08 | End: 2022-04-09

## 2022-04-08 RX ORDER — TETANUS TOXOID, REDUCED DIPHTHERIA TOXOID AND ACELLULAR PERTUSSIS VACCINE, ADSORBED 5; 2.5; 8; 8; 2.5 [IU]/.5ML; [IU]/.5ML; UG/.5ML; UG/.5ML; UG/.5ML
0.5 SUSPENSION INTRAMUSCULAR ONCE
Refills: 0 | Status: DISCONTINUED | OUTPATIENT
Start: 2022-04-08 | End: 2022-04-09

## 2022-04-08 RX ORDER — OXYTOCIN 10 UNIT/ML
333.33 VIAL (ML) INJECTION
Qty: 20 | Refills: 0 | Status: DISCONTINUED | OUTPATIENT
Start: 2022-04-08 | End: 2022-04-09

## 2022-04-08 RX ORDER — PRAMOXINE HYDROCHLORIDE 150 MG/15G
1 AEROSOL, FOAM RECTAL EVERY 4 HOURS
Refills: 0 | Status: DISCONTINUED | OUTPATIENT
Start: 2022-04-08 | End: 2022-04-09

## 2022-04-08 RX ORDER — BENZOCAINE 10 %
1 GEL (GRAM) MUCOUS MEMBRANE EVERY 6 HOURS
Refills: 0 | Status: DISCONTINUED | OUTPATIENT
Start: 2022-04-08 | End: 2022-04-09

## 2022-04-08 RX ORDER — AER TRAVELER 0.5 G/1
1 SOLUTION RECTAL; TOPICAL EVERY 4 HOURS
Refills: 0 | Status: DISCONTINUED | OUTPATIENT
Start: 2022-04-08 | End: 2022-04-09

## 2022-04-08 RX ORDER — DIBUCAINE 1 %
1 OINTMENT (GRAM) RECTAL EVERY 6 HOURS
Refills: 0 | Status: DISCONTINUED | OUTPATIENT
Start: 2022-04-08 | End: 2022-04-09

## 2022-04-08 RX ORDER — LANOLIN
1 OINTMENT (GRAM) TOPICAL EVERY 6 HOURS
Refills: 0 | Status: DISCONTINUED | OUTPATIENT
Start: 2022-04-08 | End: 2022-04-09

## 2022-04-08 RX ORDER — ERTAPENEM SODIUM 1 G/1
INJECTION, POWDER, LYOPHILIZED, FOR SOLUTION INTRAMUSCULAR; INTRAVENOUS
Refills: 0 | Status: DISCONTINUED | OUTPATIENT
Start: 2022-04-08 | End: 2022-04-09

## 2022-04-08 RX ORDER — MAGNESIUM HYDROXIDE 400 MG/1
30 TABLET, CHEWABLE ORAL
Refills: 0 | Status: DISCONTINUED | OUTPATIENT
Start: 2022-04-08 | End: 2022-04-09

## 2022-04-08 RX ADMIN — Medication 1 TABLET(S): at 12:08

## 2022-04-08 RX ADMIN — Medication 1000 MILLIUNIT(S)/MIN: at 01:39

## 2022-04-08 RX ADMIN — Medication 216 GRAM(S): at 06:03

## 2022-04-08 RX ADMIN — Medication 975 MILLIGRAM(S): at 15:48

## 2022-04-08 RX ADMIN — Medication 600 MILLIGRAM(S): at 18:30

## 2022-04-08 RX ADMIN — SODIUM CHLORIDE 3 MILLILITER(S): 9 INJECTION INTRAMUSCULAR; INTRAVENOUS; SUBCUTANEOUS at 22:05

## 2022-04-08 RX ADMIN — Medication 975 MILLIGRAM(S): at 15:20

## 2022-04-08 RX ADMIN — Medication 600 MILLIGRAM(S): at 23:53

## 2022-04-08 RX ADMIN — Medication 600 MILLIGRAM(S): at 12:08

## 2022-04-08 RX ADMIN — Medication 600 MILLIGRAM(S): at 06:38

## 2022-04-08 RX ADMIN — Medication 600 MILLIGRAM(S): at 07:00

## 2022-04-08 RX ADMIN — Medication 600 MILLIGRAM(S): at 18:03

## 2022-04-08 RX ADMIN — Medication 600 MILLIGRAM(S): at 12:40

## 2022-04-08 RX ADMIN — ERTAPENEM SODIUM 1000 MILLIGRAM(S): 1 INJECTION, POWDER, LYOPHILIZED, FOR SOLUTION INTRAMUSCULAR; INTRAVENOUS at 01:22

## 2022-04-08 NOTE — PROGRESS NOTE ADULT - ATTENDING COMMENTS
Attending note   Agree with above following corrections     PPD 1 s/p  c/w pre-eclampsia without severe features and endometritis on invanz  mendoza in place overnight due to labial swelling   ambulating with no issues   voiding freely   tolerating po   minimal bleeding   Vital Signs Last 24 Hrs  T(C): 36.3 (2022 10:17), Max: 38.6 (2022 22:00)  T(F): 97.3 (2022 10:17), Max: 101.48 (2022 22:00)  HR: 88 (2022 10:17) (70 - 141)  BP: 101/62 (2022 10:17) (101/62 - 181/95)  BP(mean): --  RR: 18 (2022 10:17) (18 - 18)  SpO2: 100% (2022 10:17) (86% - 100%)  Gen in nAD   Abd soft, fundus firm   Perineum healing well   Ext; no c/c/e     A/P: PPD 1 s/p  c/w pre-eclampsia without severe features  bps normal   endometritis on invanz, will check AM cbc  due to void     R Vimal GARDNER

## 2022-04-08 NOTE — DISCHARGE NOTE OB - PLAN OF CARE
labor , preeclampsia , , postpartum care   follow up in office 1-2 wks for BP check if fever labor , preeclampsia , , postpartum care   follow up in office 1-2 wks for BP check

## 2022-04-08 NOTE — DISCHARGE NOTE OB - CARE PLAN
Principal Discharge DX:	Vaginal delivery  Assessment and plan of treatment:	labor , preeclampsia , , postpartum care   follow up in office 1-2 wks for BP check if fever   1 Principal Discharge DX:	Vaginal delivery  Assessment and plan of treatment:	labor , preeclampsia , , postpartum care   follow up in office 1-2 wks for BP check

## 2022-04-08 NOTE — OB PROVIDER DELIVERY SUMMARY - NSPROVIDERDELIVERYNOTE_OBGYN_ALL_OB_FT
of a viable female infant over an intact perineum. MRAILEE position. Vertex delivery uncomplicated nares and mouth were suctioned . noted thick meconium. Cord around the neck x 4 and noted under left axilla. Peds present for category 2 tracing, maternal fever/ chorioamnionitis. Peds given Apgar scores were 8/9. A small periurethral and 1st degree perineal lacerations noted. 2 separate 2-0 Chromic stitches were placed for 1st degree perineal laceration and 2 separate stitches of 3-0 Vycril suture placed over the periurethral laceration done after placing a Vargas catheter was placed prior to laceration repair and left in situ as periurethral area was edematous. The placenta delivered and noted intact. EBL= 400 cc

## 2022-04-08 NOTE — DISCHARGE NOTE OB - PATIENT PORTAL LINK FT
You can access the FollowMyHealth Patient Portal offered by [facility] by registering at the following website: [email or web address]. By joining [facility]’s FollowMyHealth portal, you will also be able to view your health information using other applications (apps) compatible with our system.

## 2022-04-08 NOTE — DISCHARGE NOTE OB - ADDITIONAL INSTRUCTIONS
IF headaches, visual changes or epigastric pain, heavy bleeding, severe pelvic or perineal pain, chest or leg pain, shortness of breath or anyother issues call doctor or return to hospital. IF headaches, visual changes or epigastric pain, heavy bleeding, severe pelvic or perineal pain, chest or leg pain, shortness of breath , fever or any other issues call doctor or return to hospital.

## 2022-04-08 NOTE — OB PROVIDER DELIVERY SUMMARY - NSLACERATRAPAIRDETAILS_OBGYN_ALL_OB_FT
2 separate stitches were placed for 1st degree perineal laceration and 2 separate stitches of 3-0 Vycril suture placed over the periurethral laceration done after placing. A mendoza catheter was placed prior to laceration repair and left in situ as periurethral area was edematous

## 2022-04-08 NOTE — DISCHARGE NOTE OB - CARE PROVIDER_API CALL
Clary Tabor)  Obstetrics and Gynecology  [address], [address]  [place] [address]  Phone: [phone]  Fax: [phone]  Follow Up Time:

## 2022-04-08 NOTE — DISCHARGE NOTE OB - MEDICATION SUMMARY - MEDICATIONS TO TAKE
I will START or STAY ON the medications listed below when I get home from the hospital:    acetaminophen 325 mg oral tablet  -- 2 tab(s) by mouth every 6 hours  -- Indication: For pain    ibuprofen 600 mg oral tablet  -- 1 tab(s) by mouth every 6 hours  -- Indication: For pain     iron sulfate  -- 1 cap(s) by mouth once a day  -- Indication: For anemia    PNV Prenatal oral tablet  -- 1 tab(s) by mouth once a day  -- Indication: For postpartum

## 2022-04-08 NOTE — PROGRESS NOTE ADULT - SUBJECTIVE AND OBJECTIVE BOX
OB Progress Note:  PPD#1    S: 37yo  PPD#1 s/p . Course c/b PEC without severe features and endometritis. Patient feels well. Pain is well controlled, tolerating regular diet, passing flatus, voiding spontaneously, ambulating without difficulty. Denies heavy vaginal bleeding, CP/SOB, N/V, lightheadedness/dizziness.     O:  Vitals:  Vital Signs Last 24 Hrs  T(C): 36.8 (2022 04:00), Max: 38.6 (2022 22:00)  T(F): 98.2 (2022 04:00), Max: 101.48 (2022 22:00)  HR: 70 (2022 04:00) (70 - 141)  BP: 119/65 (2022 04:00) (104/69 - 181/95)  BP(mean): --  RR: 18 (2022 04:00) (18 - 18)  SpO2: 100% (2022 04:00) (86% - 100%)    MEDICATIONS  (STANDING):  acetaminophen     Tablet .. 975 milliGRAM(s) Oral <User Schedule>  ampicillin  IVPB 2 Gram(s) IV Intermittent every 6 hours  diphtheria/tetanus/pertussis (acellular) Vaccine (ADAcel) 0.5 milliLiter(s) IntraMuscular once  ertapenem  IVPB      ibuprofen  Tablet. 600 milliGRAM(s) Oral every 6 hours  ketorolac   Injectable 30 milliGRAM(s) IV Push once  lactated ringers Bolus 500 milliLiter(s) IV Bolus once  oxytocin Infusion 333.333 milliUNIT(s)/Min (1000 mL/Hr) IV Continuous <Continuous>  oxytocin Infusion 333.333 milliUNIT(s)/Min (1000 mL/Hr) IV Continuous <Continuous>  oxytocin Infusion. 2 milliUNIT(s)/Min (2 mL/Hr) IV Continuous <Continuous>  oxytocin Infusion. 4 milliUNIT(s)/Min (4 mL/Hr) IV Continuous <Continuous>  prenatal multivitamin 1 Tablet(s) Oral daily  sodium chloride 0.9% lock flush 3 milliLiter(s) IV Push every 8 hours      Labs:  Blood type: O Positive  Rubella IgG: RPR: Negative                          8.4<L>   18.20<H> >-----------< 202    (  @ 06:42 )             25.7<L>                        9.3<L>   12.84<H> >-----------< 216    (  @ 20:04 )             29.1<L>                        10.0<L>   9.80 >-----------< 233    (  @ 06:17 )             31.1<L>    22 @ 06:17      133<L>  |  102  |  6<L>  ----------------------------<  101<H>  4.0   |  17<L>  |  0.57        Ca    9.1      2022 06:17    TPro  6.6  /  Alb  3.4  /  TBili  <0.2  /  DBili  x   /  AST  17  /  ALT  12  /  AlkPhos  181<H>  22 @ 06:17          Physical Exam:  General: NAD  Abdomen: soft, non-tender, non-distended, fundus firm  Vaginal: No heavy vaginal bleeding  Extremities: No erythema/edema     OB Progress Note:  PPD#1    S: 35yo  PPD#1 s/p . Course c/b PEC without severe features and endometritis. Patient feels well. Pain is well controlled, tolerating regular diet, passing flatus, voiding via Vargas, ambulating without difficulty. Denies heavy vaginal bleeding, CP/SOB, N/V, lightheadedness/dizziness.     O:  Vitals:  Vital Signs Last 24 Hrs  T(C): 36.8 (2022 04:00), Max: 38.6 (2022 22:00)  T(F): 98.2 (2022 04:00), Max: 101.48 (2022 22:00)  HR: 70 (2022 04:00) (70 - 141)  BP: 119/65 (2022 04:00) (104/69 - 181/95)  BP(mean): --  RR: 18 (2022 04:00) (18 - 18)  SpO2: 100% (2022 04:00) (86% - 100%)    MEDICATIONS  (STANDING):  acetaminophen     Tablet .. 975 milliGRAM(s) Oral <User Schedule>  ampicillin  IVPB 2 Gram(s) IV Intermittent every 6 hours  diphtheria/tetanus/pertussis (acellular) Vaccine (ADAcel) 0.5 milliLiter(s) IntraMuscular once  ertapenem  IVPB      ibuprofen  Tablet. 600 milliGRAM(s) Oral every 6 hours  ketorolac   Injectable 30 milliGRAM(s) IV Push once  lactated ringers Bolus 500 milliLiter(s) IV Bolus once  oxytocin Infusion 333.333 milliUNIT(s)/Min (1000 mL/Hr) IV Continuous <Continuous>  oxytocin Infusion 333.333 milliUNIT(s)/Min (1000 mL/Hr) IV Continuous <Continuous>  oxytocin Infusion. 2 milliUNIT(s)/Min (2 mL/Hr) IV Continuous <Continuous>  oxytocin Infusion. 4 milliUNIT(s)/Min (4 mL/Hr) IV Continuous <Continuous>  prenatal multivitamin 1 Tablet(s) Oral daily  sodium chloride 0.9% lock flush 3 milliLiter(s) IV Push every 8 hours      Labs:  Blood type: O Positive  Rubella IgG: RPR: Negative                          8.4<L>   18.20<H> >-----------< 202    (  @ 06:42 )             25.7<L>                        9.3<L>   12.84<H> >-----------< 216    (  @ 20:04 )             29.1<L>                        10.0<L>   9.80 >-----------< 233    (  @ 06:17 )             31.1<L>    22 @ 06:17      133<L>  |  102  |  6<L>  ----------------------------<  101<H>  4.0   |  17<L>  |  0.57        Ca    9.1      2022 06:17    TPro  6.6  /  Alb  3.4  /  TBili  <0.2  /  DBili  x   /  AST  17  /  ALT  12  /  AlkPhos  181<H>  22 @ 06:17          Physical Exam:  General: NAD  Abdomen: soft, non-tender, non-distended, fundus firm  Vaginal: No heavy vaginal bleeding  Extremities: No erythema/edema

## 2022-04-08 NOTE — PROGRESS NOTE ADULT - ASSESSMENT
A/P: 35yo PPD#1 s/p . Course c/b PEC without severe features and endometritis. Patient is stable and doing well post-partum.   #Endometritis   - PPT 38.6@10p , 38.1@12p , continue to monitor fever curve    - c/w Invanz   - WBC: 9.8->12.8->18.2  - f/u UCx/BCx    #PEC  - BP overnight 110-150/60-70   - Consider adding antihypertensive medication   - No s/s severe PEC   - HELLP wnl P/C 0.5    #PP Care  - Pain well controlled, continue current pain regimen  - Increase ambulation, SCDs when not ambulating  - Continue regular diet    Cintia Giordano, PGY1 A/P: 35yo PPD#1 s/p . Course c/b PEC without severe features and endometritis. Patient is stable and doing well post-partum.   #Endometritis   - PPT 38.6@10p , 38.1@12p , continue to monitor fever curve    - c/w Invanz   - WBC: 9.8->12.8->18.2  - f/u UCx/BCx    #PEC  - BP overnight 110-150/60-70   - Consider adding antihypertensive medication   - No s/s severe PEC   - HELLP wnl P/C 0.5    #PP Care  - Pain well controlled, continue current pain regimen  - Increase ambulation, SCDs when not ambulating  - Continue regular diet  - d/c Vargas -> DTV    Cintia Giordano PGY1

## 2022-04-08 NOTE — DISCHARGE NOTE OB - HOSPITAL COURSE
37 y/o at 40.4 weeks   presented with contractions. pt reports 10/10 pain with contraction.Preeclampsia without severe features noted and admitted for augmentation of labor.    of viable female infant and postpartum care  37 y/o at 40.4 weeks   presented with contractions. pt reported 10/10 pain with contractions. Preeclampsia without severe features noted and admitted for augmentation of labor.    of viable female infant and postpartum care with normal Blood pressure and asymptomatic Acute Chronic Anemia of blood loss

## 2022-04-08 NOTE — DISCHARGE NOTE OB - YES, WALK AS TOLERATED
S/p Laparoscopic cholecystectomy with intraoperative cholangiogram on 07/14/19 with Dr. Casper     Pt reports that her top incision above belly button has some clear \"strings\" coming out. She denies any fevers, chills, flu-like symptoms, s/sx of infection at incision sites- redness, warmth, tenderness, foul smell nor pus/ drainage. Educated patient that sometimes sutures will surface and they will likely dissolve on their own. She will continue to monitor for a few weeks and contact our office if they do not dissolve or if she develops any of the symptoms listed above. Patient verbalized understanding and denied any additional questions or concerns at this time.    Statement Selected

## 2022-04-08 NOTE — OB NEONATOLOGY/PEDIATRICIAN DELIVERY SUMMARY - NSPEDSNEONOTESA_OBGYN_ALL_OB_FT
Pediatrician called to delivery for meconium fluids and category II FHT. Female infant born at 40 4/7 wks via  to a 37 y/o  blood type O+ mother. Maternal history of anxiety and depression (attends therapy, no medications). Prenatal course uncomplicated. Prenatal labs nr/immune/-, GBS - on 3/10. Labor and delivery complicated by maternal fever for which she received amp x2 and gent x1. Covid neg. AROM at 11:03 on  with meconium fluids. EOS score 0.62, highest maternal temperature 38.6C. Baby emerged vigorous, crying. Infant was brought to radiant warmer and warmed, dried, stimulated and suctioned. HR>100, normal respiratory effort. APGARS of 8/9. Mom is initiating breast feeding. Defers Hepatitis B vaccination. Pediatrician is Dr. Deleon. Pediatrician called to delivery for meconium fluids and category II FHT. Female infant born at 40 4/7 wks via  to a 39 y/o  blood type O+ mother. Maternal history of anxiety and depression (attends therapy, no medications). Prenatal course complicated by preeclampsia. Prenatal labs nr/immune/-, GBS - on 3/10. Labor and delivery complicated by maternal fever for which she received amp x2 and gent x1. Covid neg. AROM at 11:03 on  with meconium fluids. EOS score 0.62, highest maternal temperature 38.6C. Baby emerged with nuchal cord x4 and cord around under L axilla. She was vigorous, crying. Infant was brought to radiant warmer and warmed, dried, stimulated and suctioned. HR>100, normal respiratory effort. APGARS of 8/9. Mom is initiating breast feeding. Defers Hepatitis B vaccination. Pediatrician is Dr. Deleon.

## 2022-04-09 VITALS
HEART RATE: 70 BPM | DIASTOLIC BLOOD PRESSURE: 69 MMHG | OXYGEN SATURATION: 98 % | TEMPERATURE: 98 F | RESPIRATION RATE: 18 BRPM | SYSTOLIC BLOOD PRESSURE: 121 MMHG

## 2022-04-09 LAB
BASOPHILS # BLD AUTO: 0.03 K/UL — SIGNIFICANT CHANGE UP (ref 0–0.2)
BASOPHILS NFR BLD AUTO: 0.2 % — SIGNIFICANT CHANGE UP (ref 0–2)
CULTURE RESULTS: NO GROWTH — SIGNIFICANT CHANGE UP
EOSINOPHIL # BLD AUTO: 0.18 K/UL — SIGNIFICANT CHANGE UP (ref 0–0.5)
EOSINOPHIL NFR BLD AUTO: 1.4 % — SIGNIFICANT CHANGE UP (ref 0–6)
HCT VFR BLD CALC: 23.6 % — LOW (ref 34.5–45)
HGB BLD-MCNC: 7.6 G/DL — LOW (ref 11.5–15.5)
IANC: 9.47 K/UL — HIGH (ref 1.8–7.4)
IMM GRANULOCYTES NFR BLD AUTO: 1.2 % — SIGNIFICANT CHANGE UP (ref 0–1.5)
LYMPHOCYTES # BLD AUTO: 18.3 % — SIGNIFICANT CHANGE UP (ref 13–44)
LYMPHOCYTES # BLD AUTO: 2.4 K/UL — SIGNIFICANT CHANGE UP (ref 1–3.3)
MCHC RBC-ENTMCNC: 27.7 PG — SIGNIFICANT CHANGE UP (ref 27–34)
MCHC RBC-ENTMCNC: 32.2 GM/DL — SIGNIFICANT CHANGE UP (ref 32–36)
MCV RBC AUTO: 86.1 FL — SIGNIFICANT CHANGE UP (ref 80–100)
MONOCYTES # BLD AUTO: 0.84 K/UL — SIGNIFICANT CHANGE UP (ref 0–0.9)
MONOCYTES NFR BLD AUTO: 6.4 % — SIGNIFICANT CHANGE UP (ref 2–14)
NEUTROPHILS # BLD AUTO: 9.47 K/UL — HIGH (ref 1.8–7.4)
NEUTROPHILS NFR BLD AUTO: 72.5 % — SIGNIFICANT CHANGE UP (ref 43–77)
NRBC # BLD: 0 /100 WBCS — SIGNIFICANT CHANGE UP
NRBC # FLD: 0 K/UL — SIGNIFICANT CHANGE UP
PLATELET # BLD AUTO: 208 K/UL — SIGNIFICANT CHANGE UP (ref 150–400)
RBC # BLD: 2.74 M/UL — LOW (ref 3.8–5.2)
RBC # FLD: 16.9 % — HIGH (ref 10.3–14.5)
SPECIMEN SOURCE: SIGNIFICANT CHANGE UP
WBC # BLD: 13.08 K/UL — HIGH (ref 3.8–10.5)
WBC # FLD AUTO: 13.08 K/UL — HIGH (ref 3.8–10.5)

## 2022-04-09 RX ORDER — IBUPROFEN 200 MG
1 TABLET ORAL
Qty: 0 | Refills: 0 | DISCHARGE
Start: 2022-04-09

## 2022-04-09 RX ORDER — ACETAMINOPHEN 500 MG
2 TABLET ORAL
Qty: 0 | Refills: 0 | DISCHARGE
Start: 2022-04-09

## 2022-04-09 RX ADMIN — ERTAPENEM SODIUM 120 MILLIGRAM(S): 1 INJECTION, POWDER, LYOPHILIZED, FOR SOLUTION INTRAMUSCULAR; INTRAVENOUS at 01:35

## 2022-04-09 RX ADMIN — Medication 600 MILLIGRAM(S): at 06:15

## 2022-04-09 RX ADMIN — Medication 600 MILLIGRAM(S): at 05:41

## 2022-04-09 RX ADMIN — SODIUM CHLORIDE 3 MILLILITER(S): 9 INJECTION INTRAMUSCULAR; INTRAVENOUS; SUBCUTANEOUS at 11:21

## 2022-04-09 RX ADMIN — Medication 600 MILLIGRAM(S): at 00:33

## 2022-04-09 RX ADMIN — SODIUM CHLORIDE 3 MILLILITER(S): 9 INJECTION INTRAMUSCULAR; INTRAVENOUS; SUBCUTANEOUS at 05:41

## 2022-04-09 RX ADMIN — Medication 600 MILLIGRAM(S): at 11:21

## 2022-04-09 RX ADMIN — Medication 600 MILLIGRAM(S): at 12:00

## 2022-04-09 NOTE — PROGRESS NOTE ADULT - SUBJECTIVE AND OBJECTIVE BOX
OB Progress Note:  PPD#2    S: 37yo PPD#2 s/p . Patient feels well. Pain is well controlled. She is tolerating a regular diet and passing flatus. She is voiding spontaneously, and ambulating without difficulty. Denies CP/SOB. Denies lightheadedness/dizziness. Denies N/V. Denies headache, changes in vision, RUQ/epigastric pain.     O:  Vitals:   Vital Signs Last 24 Hrs  T(C): 36.9 (2022 05:40), Max: 37.1 (2022 17:35)  T(F): 98.4 (2022 05:40), Max: 98.7 (2022 17:35)  HR: 92 (2022 05:40) (83 - 95)  BP: 124/69 (2022 05:40) (101/62 - 124/69)  BP(mean): --  RR: 18 (2022 05:40) (17 - 18)  SpO2: 99% (2022 05:40) (99% - 100%)    MEDICATIONS  (STANDING):  acetaminophen     Tablet .. 975 milliGRAM(s) Oral <User Schedule>  diphtheria/tetanus/pertussis (acellular) Vaccine (ADAcel) 0.5 milliLiter(s) IntraMuscular once  ertapenem  IVPB      ertapenem  IVPB 1000 milliGRAM(s) IV Intermittent every 24 hours  ibuprofen  Tablet. 600 milliGRAM(s) Oral every 6 hours  ketorolac   Injectable 30 milliGRAM(s) IV Push once  lactated ringers Bolus 500 milliLiter(s) IV Bolus once  oxytocin Infusion 333.333 milliUNIT(s)/Min (1000 mL/Hr) IV Continuous <Continuous>  oxytocin Infusion 333.333 milliUNIT(s)/Min (1000 mL/Hr) IV Continuous <Continuous>  oxytocin Infusion. 2 milliUNIT(s)/Min (2 mL/Hr) IV Continuous <Continuous>  oxytocin Infusion. 4 milliUNIT(s)/Min (4 mL/Hr) IV Continuous <Continuous>  prenatal multivitamin 1 Tablet(s) Oral daily  sodium chloride 0.9% lock flush 3 milliLiter(s) IV Push every 8 hours    MEDICATIONS  (PRN):  benzocaine 20%/menthol 0.5% Spray 1 Spray(s) Topical every 6 hours PRN for Perineal discomfort  dibucaine 1% Ointment 1 Application(s) Topical every 6 hours PRN Perineal discomfort  diphenhydrAMINE 25 milliGRAM(s) Oral every 6 hours PRN Pruritus  hydrocortisone 1% Cream 1 Application(s) Topical every 6 hours PRN Moderate Pain (4-6)  lanolin Ointment 1 Application(s) Topical every 6 hours PRN nipple soreness  magnesium hydroxide Suspension 30 milliLiter(s) Oral two times a day PRN Constipation  pramoxine 1%/zinc 5% Cream 1 Application(s) Topical every 4 hours PRN Moderate Pain (4-6)  simethicone 80 milliGRAM(s) Chew every 4 hours PRN Gas  witch hazel Pads 1 Application(s) Topical every 4 hours PRN Perineal discomfort      Labs:  Blood type: O Positive  Rubella IgG: RPR: Negative                          7.6<L>   13.08<H> >-----------< 208    (  @ 06:46 )             23.6<L>                        8.4<L>   18.20<H> >-----------< 202    (  @ 06:42 )             25.7<L>                        9.3<L>   12.84<H> >-----------< 216    (  @ 20:04 )             29.1<L>                        10.0<L>   9.80 >-----------< 233    (  @ 06:17 )             31.1<L>    22 @ 06:17      133<L>  |  102  |  6<L>  ----------------------------<  101<H>  4.0   |  17<L>  |  0.57        Ca    9.1      2022 06:17    TPro  6.6  /  Alb  3.4  /  TBili  <0.2  /  DBili  x   /  AST  17  /  ALT  12  /  AlkPhos  181<H>  22 @ 06:17          Physical Exam:  General: NAD  Abdomen: soft, non-tender, non-distended, fundus firm  Vaginal: Lochia wnl  Extremities: No erythema/edema

## 2022-04-09 NOTE — PROGRESS NOTE ADULT - ASSESSMENT
A/P: 35yo PPD#2 s/p  c/b PEC and chorioamnionitis.  Patient is stable and doing well post-partum.      #Endometritis   - PPT 38.6@10p , 38.1@12p , continue to monitor fever curve    - c/w Invanz   - WBC: 9.8->12.8->18.2->13.08  - f/u UCx/BCx    #PEC  - Bps well controlled overnight  - cont to monitor for s/s sPEC   - HELLP wnl P/C 0.5    #PP Care  - Pain well controlled, continue current pain regimen  - Increase ambulation, SCDs when not ambulating  - Continue regular diet  - aprpeciate SW recs for hx of anxiety/depression  - Discharge planning     Meg Wallis, PGY1   A/P: 35yo PPD#2 s/p  c/b PEC and chorioamnionitis.  Patient is stable and doing well post-partum.      #Endometritis   - PPT 38.6@10p /, 38.1@12p /8, continue to monitor fever curve    - c/w Invanz   - WBC: 9.8->12.8->18.2->13.08  - f/u UCx/BCx    #PEC  - Bps well controlled overnight  - cont to monitor for s/s sPEC   - HELLP wnl P/C 0.5    #PP Care  - Pain well controlled, continue current pain regimen  - Increase ambulation, SCDs when not ambulating  - Continue regular diet  - aprpeciate SW recs for hx of anxiety/depression  - Discharge planning     Meg Wallis, PGY1    Evaluated at 245pm  Attending note   patient in good spirits she states desires to go home   VSS +PEC and noted normal BP on no meds   abdomen soft NT   uterine fundus Firm an nontender no tenderness over uterus   ext- nontender mild edema   A/p patient with anemia second to Acute Chronic Anemia o blood loss - currently reports minimal lochia   states no symptoms with hg 7.6 WBC from 18 to 8 and blood cx and Ucx = both negative up to date   plan discharge to home - [facility] program referral , check BP at home and follow up with doctor in 2 wks XIOMY Tran

## 2022-04-12 LAB
CULTURE RESULTS: SIGNIFICANT CHANGE UP
CULTURE RESULTS: SIGNIFICANT CHANGE UP
SPECIMEN SOURCE: SIGNIFICANT CHANGE UP
SPECIMEN SOURCE: SIGNIFICANT CHANGE UP

## 2022-04-13 ENCOUNTER — EMERGENCY (EMERGENCY)
Facility: HOSPITAL | Age: 37
LOS: 1 days | Discharge: HOME | End: 2022-04-13
Attending: EMERGENCY MEDICINE | Admitting: EMERGENCY MEDICINE
Payer: COMMERCIAL

## 2022-04-13 VITALS
HEIGHT: 68 IN | HEART RATE: 104 BPM | SYSTOLIC BLOOD PRESSURE: 134 MMHG | TEMPERATURE: 97 F | RESPIRATION RATE: 20 BRPM | OXYGEN SATURATION: 100 % | DIASTOLIC BLOOD PRESSURE: 95 MMHG

## 2022-04-13 VITALS
SYSTOLIC BLOOD PRESSURE: 130 MMHG | OXYGEN SATURATION: 100 % | RESPIRATION RATE: 20 BRPM | HEART RATE: 80 BPM | DIASTOLIC BLOOD PRESSURE: 96 MMHG

## 2022-04-13 LAB
ALBUMIN SERPL ELPH-MCNC: 3.8 G/DL — SIGNIFICANT CHANGE UP (ref 3.3–5)
ALP SERPL-CCNC: 123 U/L — HIGH (ref 40–120)
ALT FLD-CCNC: 29 U/L — SIGNIFICANT CHANGE UP (ref 4–33)
ANION GAP SERPL CALC-SCNC: 14 MMOL/L — SIGNIFICANT CHANGE UP (ref 7–14)
APPEARANCE UR: CLEAR — SIGNIFICANT CHANGE UP
APTT BLD: 30.1 SEC — SIGNIFICANT CHANGE UP (ref 27–36.3)
AST SERPL-CCNC: 24 U/L — SIGNIFICANT CHANGE UP (ref 4–32)
BACTERIA # UR AUTO: ABNORMAL
BASOPHILS # BLD AUTO: 0.04 K/UL — SIGNIFICANT CHANGE UP (ref 0–0.2)
BASOPHILS NFR BLD AUTO: 0.4 % — SIGNIFICANT CHANGE UP (ref 0–2)
BILIRUB SERPL-MCNC: 0.2 MG/DL — SIGNIFICANT CHANGE UP (ref 0.2–1.2)
BILIRUB UR-MCNC: NEGATIVE — SIGNIFICANT CHANGE UP
BUN SERPL-MCNC: 7 MG/DL — SIGNIFICANT CHANGE UP (ref 7–23)
CALCIUM SERPL-MCNC: 9 MG/DL — SIGNIFICANT CHANGE UP (ref 8.4–10.5)
CHLORIDE SERPL-SCNC: 103 MMOL/L — SIGNIFICANT CHANGE UP (ref 98–107)
CO2 SERPL-SCNC: 20 MMOL/L — LOW (ref 22–31)
COLOR SPEC: ABNORMAL
CREAT ?TM UR-MCNC: 36 MG/DL — SIGNIFICANT CHANGE UP
CREAT SERPL-MCNC: 0.7 MG/DL — SIGNIFICANT CHANGE UP (ref 0.5–1.3)
DIFF PNL FLD: ABNORMAL
EGFR: 113 ML/MIN/1.73M2 — SIGNIFICANT CHANGE UP
EOSINOPHIL # BLD AUTO: 0.19 K/UL — SIGNIFICANT CHANGE UP (ref 0–0.5)
EOSINOPHIL NFR BLD AUTO: 2.1 % — SIGNIFICANT CHANGE UP (ref 0–6)
EPI CELLS # UR: 4 /HPF — SIGNIFICANT CHANGE UP (ref 0–5)
FIBRINOGEN PPP-MCNC: 822 MG/DL — HIGH (ref 330–520)
FLUAV AG NPH QL: SIGNIFICANT CHANGE UP
FLUBV AG NPH QL: SIGNIFICANT CHANGE UP
GLUCOSE SERPL-MCNC: 89 MG/DL — SIGNIFICANT CHANGE UP (ref 70–99)
GLUCOSE UR QL: NEGATIVE — SIGNIFICANT CHANGE UP
HCT VFR BLD CALC: 30.5 % — LOW (ref 34.5–45)
HGB BLD-MCNC: 9.9 G/DL — LOW (ref 11.5–15.5)
HYALINE CASTS # UR AUTO: 0 /LPF — SIGNIFICANT CHANGE UP (ref 0–7)
IANC: 5.5 K/UL — SIGNIFICANT CHANGE UP (ref 1.8–7.4)
IMM GRANULOCYTES NFR BLD AUTO: 1.6 % — HIGH (ref 0–1.5)
INR BLD: 0.96 RATIO — SIGNIFICANT CHANGE UP (ref 0.88–1.16)
KETONES UR-MCNC: NEGATIVE — SIGNIFICANT CHANGE UP
LDH SERPL L TO P-CCNC: 261 U/L — HIGH (ref 135–225)
LEUKOCYTE ESTERASE UR-ACNC: ABNORMAL
LYMPHOCYTES # BLD AUTO: 2.49 K/UL — SIGNIFICANT CHANGE UP (ref 1–3.3)
LYMPHOCYTES # BLD AUTO: 27.2 % — SIGNIFICANT CHANGE UP (ref 13–44)
MAGNESIUM SERPL-MCNC: 1.9 MG/DL — SIGNIFICANT CHANGE UP (ref 1.6–2.6)
MCHC RBC-ENTMCNC: 27.9 PG — SIGNIFICANT CHANGE UP (ref 27–34)
MCHC RBC-ENTMCNC: 32.5 GM/DL — SIGNIFICANT CHANGE UP (ref 32–36)
MCV RBC AUTO: 85.9 FL — SIGNIFICANT CHANGE UP (ref 80–100)
MONOCYTES # BLD AUTO: 0.79 K/UL — SIGNIFICANT CHANGE UP (ref 0–0.9)
MONOCYTES NFR BLD AUTO: 8.6 % — SIGNIFICANT CHANGE UP (ref 2–14)
NEUTROPHILS # BLD AUTO: 5.5 K/UL — SIGNIFICANT CHANGE UP (ref 1.8–7.4)
NEUTROPHILS NFR BLD AUTO: 60.1 % — SIGNIFICANT CHANGE UP (ref 43–77)
NITRITE UR-MCNC: NEGATIVE — SIGNIFICANT CHANGE UP
NRBC # BLD: 0 /100 WBCS — SIGNIFICANT CHANGE UP
NRBC # FLD: 0 K/UL — SIGNIFICANT CHANGE UP
PH UR: 7.5 — SIGNIFICANT CHANGE UP (ref 5–8)
PLATELET # BLD AUTO: 413 K/UL — HIGH (ref 150–400)
POTASSIUM SERPL-MCNC: 4.1 MMOL/L — SIGNIFICANT CHANGE UP (ref 3.5–5.3)
POTASSIUM SERPL-SCNC: 4.1 MMOL/L — SIGNIFICANT CHANGE UP (ref 3.5–5.3)
PROT ?TM UR-MCNC: 13 MG/DL — SIGNIFICANT CHANGE UP
PROT ?TM UR-MCNC: 13 MG/DL — SIGNIFICANT CHANGE UP
PROT SERPL-MCNC: 7 G/DL — SIGNIFICANT CHANGE UP (ref 6–8.3)
PROT UR-MCNC: ABNORMAL
PROT/CREAT UR-RTO: 0.4 RATIO — HIGH (ref 0–0.2)
PROTHROM AB SERPL-ACNC: 11.1 SEC — SIGNIFICANT CHANGE UP (ref 10.5–13.4)
RBC # BLD: 3.55 M/UL — LOW (ref 3.8–5.2)
RBC # FLD: 16.6 % — HIGH (ref 10.3–14.5)
RBC CASTS # UR COMP ASSIST: 5 /HPF — HIGH (ref 0–4)
RSV RNA NPH QL NAA+NON-PROBE: SIGNIFICANT CHANGE UP
SARS-COV-2 RNA SPEC QL NAA+PROBE: SIGNIFICANT CHANGE UP
SODIUM SERPL-SCNC: 137 MMOL/L — SIGNIFICANT CHANGE UP (ref 135–145)
SP GR SPEC: 1.01 — SIGNIFICANT CHANGE UP (ref 1–1.05)
URATE SERPL-MCNC: 4.8 MG/DL — SIGNIFICANT CHANGE UP (ref 2.5–7)
UROBILINOGEN FLD QL: SIGNIFICANT CHANGE UP
WBC # BLD: 9.16 K/UL — SIGNIFICANT CHANGE UP (ref 3.8–10.5)
WBC # FLD AUTO: 9.16 K/UL — SIGNIFICANT CHANGE UP (ref 3.8–10.5)
WBC UR QL: 22 /HPF — HIGH (ref 0–5)

## 2022-04-13 PROCEDURE — 99285 EMERGENCY DEPT VISIT HI MDM: CPT

## 2022-04-13 PROCEDURE — 76856 US EXAM PELVIC COMPLETE: CPT | Mod: 26

## 2022-04-13 RX ORDER — KETOROLAC TROMETHAMINE 30 MG/ML
15 SYRINGE (ML) INJECTION ONCE
Refills: 0 | Status: DISCONTINUED | OUTPATIENT
Start: 2022-04-13 | End: 2022-04-13

## 2022-04-13 RX ADMIN — Medication 15 MILLIGRAM(S): at 19:49

## 2022-04-13 NOTE — ED PROVIDER NOTE - ATTENDING CONTRIBUTION TO CARE
Dr. Forbes:  I have personally performed a face to face bedside history and physical examination of this patient. I have discussed the history, examination, review of systems, assessment and plan of management with the resident. I have reviewed the electronic medical record and amended it to reflect my history, review of systems, physical exam, assessment and plan.    36F  s/p   with pre-eclampsia and chorioamnionitis presents with 1 day of passing large blood clots vaginally.  also c/o ankle swelling over past several days and headache.  +Lower abdominal cramping that feels like contractions.  Denies fever/chills, cp ,n/v/d.  States she has SOB at baseline, no different than usual.    Exam:  - nad  - rrr   -ctab   -abd soft ntnd  - +bilateral ankle swelling  - +moderate amount of blood in vaginal vault, no uterine TTP    A/P  - passing clots, eval for retained products; BP 130s/90s, consistent with pre-eclampsia  - basic labs, coags, ldh, UA, TVUS  - OB consult

## 2022-04-13 NOTE — CONSULT NOTE ADULT - SUBJECTIVE AND OBJECTIVE BOX
JACLYN BRENNAN  36y  Female [id number]    HPI:  37 y/o  PPD#6 from  on  presenting with vaginal bleeding. Patient's labor course c/b PEC w/o severe features and chorioamnionitis s/p invanz. Had  on  with periurethral tear, EBL 400cc, d/c'ed on POD#2. Patient states that she hasn't been sleeping a lot of at home and having intermittent headaches relieved with tylenol. She also reports progressively worsening swelling of lower extremities, has been elevating extremities at home. She states that previously she was wearing 1 maternity pad per day, but today she passed a large clot (size of grapefruit) and used 4 pads throughout the day.  Also reports contraction type pain after passing clot. Patient subsequently developed lightheadedness and called the office and was sent to ED. Denies  fevers, chills, changes in vision, nausea, vomiting, or RUQ pain, dysuria, or any other complaints. Patient is breastfeeding without difficulty.       Name of GYN Physician: Dr. Tabor    POB:  [birth year], SABx1   Pgyn: Denies   PMH: Anxiety  PSH: D&Cx1  Meds: none  All: NKDA  SH: Denies smoking use, drug use, alcohol use  Home meds:     Hospital Meds:   MEDICATIONS  (STANDING):    MEDICATIONS  (PRN):      Allergies    No Known Allergies    Intolerances        PAST MEDICAL & SURGICAL HISTORY:  No pertinent past medical history    No significant past surgical history        FAMILY HISTORY:        Vital Signs Last 24 Hrs  T(C): 36.3 (2022 16:24), Max: 36.3 (2022 16:24)  T(F): 97.3 (2022 16:24), Max: 97.3 (2022 16:24)  HR: 80 (2022 17:16) (80 - 104)  BP: 130/96 (2022 17:16) (130/96 - 134/95)  BP(mean): --  RR: 20 (2022 17:16) (20 - 20)  SpO2: 100% (2022 17:16) (100% - 100%)    Physical Exam:   General: sitting comfortably in bed, NAD   CV: RRR  Lungs: CTA b/l  Abd: Soft, non-tender, non-distended.  :  Light bleeding on pad.    External labia wnl.  Bimanual exam firm uterus, approximately 12 weeks size, adnexa non palpable b/l. Cervix dilated 2-3   cm   Speculum Exam: Mild to moderate bleeding from os. 15 cc of vaginal bleeding in vaginal vault.   Ext: non-tender b/l, no edema     LABS:                        9.9    9.16  )-----------( 413      ( 2022 17:30 )             30.5         137  |  103  |  7   ----------------------------<  89  4.1   |  20<L>  |  0.70    Ca    9.0      2022 17:30  Mg     1.90         TPro  7.0  /  Alb  3.8  /  TBili  0.2  /  DBili  x   /  AST  24  /  ALT  29  /  AlkPhos  123<H>      I&O's Detail    PT/INR - ( 2022 18:51 )   PT: 11.1 sec;   INR: 0.96 ratio         PTT - ( 2022 18:51 )  PTT:30.1 sec  Urinalysis Basic - ( 2022 17:30 )    Color: Light Brown / Appearance: Clear / S.008 / pH: x  Gluc: x / Ketone: Negative  / Bili: Negative / Urobili: <2 mg/dL   Blood: x / Protein: Trace / Nitrite: Negative   Leuk Esterase: Large / RBC: 5 /HPF / WBC 22 /HPF   Sq Epi: x / Non Sq Epi: 4 /HPF / Bacteria: Few        RADIOLOGY & ADDITIONAL STUDIES:  < from: US Pelvis Complete (22 @ 18:01) >  FINDINGS:    Uterus: 14.1 cm x 6.5 cm x 10.6 cm. Enlarged demonstrating heterogeneous   echotexture.  Endometrium: 29 mm. Heterogeneous echotexture with cystic changes.    Right ovary: 3.5 cm x 1.8 cm x 2.2 cm. Within normal limits. Blood flow   identified within the right ovarian parenchyma.  Left ovary: 2.5 cm x 1.8 cm x 2.1 cm. Within normal limits. Blood flow   identified within the left ovarianparenchyma.    Fluid: No free pelvic fluid identified.    IMPRESSION: Heterogeneous echotexture is identified within the   endometrial region measuring 29 mm. Retained products of conception   cannot be excluded.      < end of copied text >

## 2022-04-13 NOTE — ED PROVIDER NOTE - PHYSICAL EXAMINATION
General: non-toxic appearing, in no respiratory distress  HEENT: atraumatic, normocephalic; pupils are equal, round and react to light, extraocular movements intact bilaterally without deficits, no conjunctival pallor, mucous membranes moist  Neck: no jugular venous distension, full range of motion  Chest/Lung: clear to auscultation bilaterally, no wheezes/rhonchi/rales  Heart: regular rate and rhythm, no murmur/gallops/rubs  Abdomen: normal bowel sounds, soft, non-tender, non-distended  Extremities: lower extremity edema, +2 radial pulses bilaterally, +2 dorsalis pedis pulses bilaterally  Musculoskeletal: full range of motion of all 4 extremities  Nervous System: alert and oriented, no motor deficits or sensory deficits; CNII-XII grossly intact; no focal neurologic deficits  Skin: no rashes/lacerations noted  Pelvic exam: chaperoned by Dr. Forbes, clots appreciated, no cervical/adnexal tenderness noted

## 2022-04-13 NOTE — ED PROVIDER NOTE - CLINICAL SUMMARY MEDICAL DECISION MAKING FREE TEXT BOX
Patient is a 36 year-old-female, ,  4/ with preeclampsia and chorioamnionitis, presents with 1-day history of passing large clots, ankle swelling and headache. Concerning for preeclampsia given initial dBP of 95. Will repeat BP. Will r/o retained products with TVUS. Labs, covid and OB consult.

## 2022-04-13 NOTE — ED PROVIDER NOTE - NSFOLLOWUPINSTRUCTIONS_ED_ALL_ED_FT
You were seen for vaginal bleeding. This is most likely due to clots that need to be passed.    After taking misoprostol (medication that goes in your cheek) you will cramp for about 24-48 hours in an effort to expel the clots. You may use tylenol for pain. Please do not exceed 3250 mg in 24 hours. You may use ibuprofen for pain. Please do not exceed 3000 mg in 24 hours.    Please follow up with your OB doctor on Monday.    Please continue to take your BPs and report to doctor if elevated.      SEEK IMMEDIATE MEDICAL CARE IF YOU HAVE ANY OF THE FOLLOWING SYMPTOMS: extreme weakness/chest pain/shortness of breath, pain uncontrolled by over the counter medications, or any signs of dehydration.

## 2022-04-13 NOTE — ED PROVIDER NOTE - OBJECTIVE STATEMENT
Patient is a 36 year-old-female, ,   with preeclampsia and chorioamnionitis, presents with 1-day history of passing large clots, ankle swelling and headache. Per patient, her bleeding was minimal since her delivery until this morning. She passed "1 lb of clots" at around 2pm, which prompted this visit. also noted ankle swelling, which is unusual for her. + headache, + SOB, though states that she usually gets headache and feels short of breath. + chills, +abdominal cramping and back pain. Denies fever, chest pain.

## 2022-04-13 NOTE — ED ADULT NURSE NOTE - OBJECTIVE STATEMENT
Pt A+OX4 c/o vaginal bleeding with clots today.  C/O back pain, ankle swelling since yesterday, SOB, dizziness, lightheadedness, and cramping.  Says her blood counts were low when she was discharged after having her baby 1 week ago. Pt A+OX4 c/o vaginal bleeding with clots today.  C/O back pain, ankle swelling since yesterday, SOB, dizziness, lightheadedness, and cramping.  Says her blood counts were low when she was discharged after having her baby 1 week ago.  #18g SL R AC placed.  Labs obtained and sent as ordered.  CM placed.  VS repeated.  MD at bedside.  Seizure precautions taken.

## 2022-04-13 NOTE — ED PROVIDER NOTE - PATIENT PORTAL LINK FT
You can access the FollowMyHealth Patient Portal offered by [facility] by registering at the following website: [email or web address]. By joining [employer]’s FollowMyHealth portal, you will also be able to view your health information using other applications (apps) compatible with our system.

## 2022-04-13 NOTE — CONSULT NOTE ADULT - ASSESSMENT
37 y/o  PPD#6 from  on  with labor course c/b PEC and chorioamnionitis presenting with vaginal bleeding. VSS. H/H stable. Physical examination without evidence of heavy vaginal bleeding. TVUS showing thickened endometrium, likely blood clot in SHERINE. Of note, patient has not been checking BP's at home, reports headaches relieved with Tylenol in the setting of sleep deprivation. HELLP labs are normal and only mild range BP's (130/90's).   - Cytotec 800 mg buccal (place between gum and cheeck for 30 min and swallow after)  - Motrin, tylenol for pain control  - Patient encouraged to check BP's at least 2x a day and to call office with elevated readings above 160/110  - Bleeding precautions given    D/w Dr. Dontae Leiva  PGY-2

## 2022-04-13 NOTE — ED ADULT TRIAGE NOTE - CHIEF COMPLAINT QUOTE
on . presenting for passing large clots, back pain, ankle swelling x1 day. pt states her bleeding was minimal until this morning when she started passing large clots. l&d called pt to be seen in the main ER.

## 2022-04-13 NOTE — ED PROVIDER NOTE - PROGRESS NOTE DETAILS
bridgette:  spoke with OB.  BP 130s/90s in setting of known pre-eclampsia, recommend no intervention for now.  Pending labs and TVUS in regards to vaginal bleeding. Wilder PGY1   OB consulted for possible retained products. Joseph Frankel PGY3: OB saw patient. Do not think patient has retained products, rather just some retained blood clots. They are recommending misoprostol buccally and DC to see outpatient OB. Discussed plan and return precautions with patient who understands and agrees. All questions answered.

## 2022-04-14 LAB
CULTURE RESULTS: SIGNIFICANT CHANGE UP
SPECIMEN SOURCE: SIGNIFICANT CHANGE UP

## 2022-06-02 LAB — SURGICAL PATHOLOGY STUDY: SIGNIFICANT CHANGE UP

## 2022-06-29 ENCOUNTER — APPOINTMENT (OUTPATIENT)
Dept: INTERNAL MEDICINE | Facility: CLINIC | Age: 37
End: 2022-06-29

## 2022-06-29 VITALS
WEIGHT: 161 LBS | HEART RATE: 68 BPM | OXYGEN SATURATION: 98 % | SYSTOLIC BLOOD PRESSURE: 120 MMHG | BODY MASS INDEX: 24.4 KG/M2 | TEMPERATURE: 98 F | HEIGHT: 68 IN | DIASTOLIC BLOOD PRESSURE: 73 MMHG

## 2022-06-29 DIAGNOSIS — A60.04 HERPESVIRAL VULVOVAGINITIS: ICD-10-CM

## 2022-06-29 DIAGNOSIS — Z80.3 FAMILY HISTORY OF MALIGNANT NEOPLASM OF BREAST: ICD-10-CM

## 2022-06-29 DIAGNOSIS — Z78.9 OTHER SPECIFIED HEALTH STATUS: ICD-10-CM

## 2022-06-29 DIAGNOSIS — Z86.2 PERSONAL HISTORY OF DISEASES OF THE BLOOD AND BLOOD-FORMING ORGANS AND CERTAIN DISORDERS INVOLVING THE IMMUNE MECHANISM: ICD-10-CM

## 2022-06-29 PROCEDURE — 99395 PREV VISIT EST AGE 18-39: CPT | Mod: 25

## 2022-06-29 PROCEDURE — 36415 COLL VENOUS BLD VENIPUNCTURE: CPT

## 2022-06-29 PROCEDURE — G0444 DEPRESSION SCREEN ANNUAL: CPT | Mod: 59

## 2022-06-29 NOTE — HISTORY OF PRESENT ILLNESS
[FreeTextEntry1] : ANnual physical exam [de-identified] : Patient is 36 year female  with PMH o f Neutropenia, HSV type 2  , Hyperlipidemia  came today for annual physical exam\par

## 2022-06-29 NOTE — HEALTH RISK ASSESSMENT
[Never] : Never [No] : No [No falls in past year] : Patient reported no falls in the past year [0] : 2) Feeling down, depressed, or hopeless: Not at all (0) [PHQ-2 Negative - No further assessment needed] : PHQ-2 Negative - No further assessment needed [[id number]] : 0 [Patient reported mammogram was normal] : Patient reported mammogram was normal [Patient reported PAP Smear was normal] : Patient reported PAP Smear was normal [HIV Test offered] : HIV Test offered [Hepatitis C test offered] : Hepatitis C test offered [Change in mental status noted] : No change in mental status noted [Language] : denies difficulty with language [Handling Complex Tasks] : denies difficulty handling complex tasks [None] : None [With Family] : lives with family [] :  [# Of Children ___] : has [unfilled] children [Sexually Active] : sexually active [Feels Safe at Home] : Feels safe at home [Fully functional (bathing, dressing, toileting, transferring, walking, feeding)] : Fully functional (bathing, dressing, toileting, transferring, walking, feeding) [Fully functional (using the telephone, shopping, preparing meals, housekeeping, doing laundry, using] : Fully functional and needs no help or supervision to perform IADLs (using the telephone, shopping, preparing meals, housekeeping, doing laundry, using transportation, managing medications and managing finances) [Reports changes in hearing] : Reports no changes in hearing [Reports changes in vision] : Reports no changes in vision [Smoke Detector] : smoke detector [Seat Belt] :  uses seat belt [Sunscreen] : uses sunscreen [MammogramDate] : 11/01/2021 [MammogramComments] : as per patient was OK [PapSmearDate] : 02/26/2021 [HepatitisCDate] : 06/29/2022 [HIVDate] : 06/29/2022

## 2022-07-06 ENCOUNTER — APPOINTMENT (OUTPATIENT)
Dept: INTERNAL MEDICINE | Facility: CLINIC | Age: 37
End: 2022-07-06

## 2022-07-06 DIAGNOSIS — R29.898 OTHER SYMPTOMS AND SIGNS INVOLVING THE MUSCULOSKELETAL SYSTEM: ICD-10-CM

## 2022-07-06 DIAGNOSIS — Z13.220 ENCOUNTER FOR SCREENING FOR LIPOID DISORDERS: ICD-10-CM

## 2022-07-06 LAB
25(OH)D3 SERPL-MCNC: 42 NG/ML
ALBUMIN SERPL ELPH-MCNC: 4.5 G/DL
ALP BLD-CCNC: 93 U/L
ALT SERPL-CCNC: 17 U/L
ANION GAP SERPL CALC-SCNC: 10 MMOL/L
AST SERPL-CCNC: 20 U/L
BASOPHILS # BLD AUTO: 0.02 K/UL
BASOPHILS NFR BLD AUTO: 0.4 %
BILIRUB SERPL-MCNC: <0.2 MG/DL
BUN SERPL-MCNC: 7 MG/DL
CALCIUM SERPL-MCNC: 9.2 MG/DL
CHLORIDE SERPL-SCNC: 105 MMOL/L
CHOLEST SERPL-MCNC: 260 MG/DL
CO2 SERPL-SCNC: 22 MMOL/L
CREAT SERPL-MCNC: 0.83 MG/DL
EGFR: 92 ML/MIN/1.73M2
EOSINOPHIL # BLD AUTO: 0.09 K/UL
EOSINOPHIL NFR BLD AUTO: 2 %
ESTIMATED AVERAGE GLUCOSE: 117 MG/DL
GLUCOSE SERPL-MCNC: 82 MG/DL
HBA1C MFR BLD HPLC: 5.7 %
HCT VFR BLD CALC: 36.5 %
HCV AB SER QL: NONREACTIVE
HCV S/CO RATIO: 0.19 S/CO
HDLC SERPL-MCNC: 48 MG/DL
HGB BLD-MCNC: 11.3 G/DL
IMM GRANULOCYTES NFR BLD AUTO: 0.2 %
LDLC SERPL CALC-MCNC: 175 MG/DL
LYMPHOCYTES # BLD AUTO: 2.59 K/UL
LYMPHOCYTES NFR BLD AUTO: 56.4 %
MAN DIFF?: NORMAL
MCHC RBC-ENTMCNC: 27.6 PG
MCHC RBC-ENTMCNC: 31 GM/DL
MCV RBC AUTO: 89.2 FL
MONOCYTES # BLD AUTO: 0.3 K/UL
MONOCYTES NFR BLD AUTO: 6.5 %
NEUTROPHILS # BLD AUTO: 1.58 K/UL
NEUTROPHILS NFR BLD AUTO: 34.5 %
NONHDLC SERPL-MCNC: 212 MG/DL
PLATELET # BLD AUTO: 243 K/UL
POTASSIUM SERPL-SCNC: 4.3 MMOL/L
PROT SERPL-MCNC: 7.3 G/DL
RBC # BLD: 4.09 M/UL
RBC # FLD: 14.3 %
SODIUM SERPL-SCNC: 138 MMOL/L
TRIGL SERPL-MCNC: 185 MG/DL
TSH SERPL-ACNC: 1.06 UIU/ML
VZV AB TITR SER: NEGATIVE
VZV IGG SER IF-ACNC: 56.3 INDEX
WBC # FLD AUTO: 4.59 K/UL

## 2022-07-06 PROCEDURE — 99213 OFFICE O/P EST LOW 20 MIN: CPT | Mod: 95

## 2022-07-06 NOTE — PHYSICAL EXAM
[No Acute Distress] : no acute distress [Well Nourished] : well nourished [No JVD] : no jugular venous distention [Supple] : supple

## 2022-07-06 NOTE — HISTORY OF PRESENT ILLNESS
[Home] : at home, [unfilled] , at the time of the visit. [Medical Office: ([facility])___] : at the medical office located in  [Verbal consent obtained from patient] : the patient, [unfilled] [FreeTextEntry1] : Verbal consent given on 07/06/2022 at 16:25 by JACLYN BRENNAN, [].\par  [de-identified] : Patient is 36 year female  with PMH of Hyperlipidemia , has a telehealth visit\par Patient to f/u with recent lab results- reviewed and d/w the patient\par Found to have\par Pre DM\par Hyperlipidemia\par C/o knee clicking  b/l\par \par

## 2022-07-07 ENCOUNTER — OUTPATIENT (OUTPATIENT)
Dept: OUTPATIENT SERVICES | Facility: HOSPITAL | Age: 37
LOS: 1 days | End: 2022-07-07

## 2022-07-07 VITALS
SYSTOLIC BLOOD PRESSURE: 113 MMHG | DIASTOLIC BLOOD PRESSURE: 67 MMHG | TEMPERATURE: 98 F | OXYGEN SATURATION: 99 % | RESPIRATION RATE: 16 BRPM | WEIGHT: 160.06 LBS | HEART RATE: 74 BPM | HEIGHT: 68 IN

## 2022-07-07 DIAGNOSIS — Z30.2 ENCOUNTER FOR STERILIZATION: ICD-10-CM

## 2022-07-07 DIAGNOSIS — Z98.890 OTHER SPECIFIED POSTPROCEDURAL STATES: Chronic | ICD-10-CM

## 2022-07-07 LAB
BLD GP AB SCN SERPL QL: NEGATIVE — SIGNIFICANT CHANGE UP
HCG UR QL: NEGATIVE — SIGNIFICANT CHANGE UP
HCT VFR BLD CALC: 35.3 % — SIGNIFICANT CHANGE UP (ref 34.5–45)
HGB BLD-MCNC: 11.5 G/DL — SIGNIFICANT CHANGE UP (ref 11.5–15.5)
MCHC RBC-ENTMCNC: 28.5 PG — SIGNIFICANT CHANGE UP (ref 27–34)
MCHC RBC-ENTMCNC: 32.6 GM/DL — SIGNIFICANT CHANGE UP (ref 32–36)
MCV RBC AUTO: 87.6 FL — SIGNIFICANT CHANGE UP (ref 80–100)
NRBC # BLD: 0 /100 WBCS — SIGNIFICANT CHANGE UP
NRBC # FLD: 0 K/UL — SIGNIFICANT CHANGE UP
PLATELET # BLD AUTO: 279 K/UL — SIGNIFICANT CHANGE UP (ref 150–400)
RBC # BLD: 4.03 M/UL — SIGNIFICANT CHANGE UP (ref 3.8–5.2)
RBC # FLD: 14.2 % — SIGNIFICANT CHANGE UP (ref 10.3–14.5)
RH IG SCN BLD-IMP: POSITIVE — SIGNIFICANT CHANGE UP
WBC # BLD: 4.28 K/UL — SIGNIFICANT CHANGE UP (ref 3.8–10.5)
WBC # FLD AUTO: 4.28 K/UL — SIGNIFICANT CHANGE UP (ref 3.8–10.5)

## 2022-07-07 RX ORDER — SODIUM CHLORIDE 9 MG/ML
1000 INJECTION, SOLUTION INTRAVENOUS
Refills: 0 | Status: DISCONTINUED | OUTPATIENT
Start: 2022-07-18 | End: 2022-08-01

## 2022-07-07 RX ORDER — FERROUS SULFATE 325(65) MG
1 TABLET ORAL
Qty: 0 | Refills: 0 | DISCHARGE

## 2022-07-07 NOTE — H&P PST ADULT - NSICDXPASTSURGICALHX_GEN_ALL_CORE_FT
PAST SURGICAL HISTORY:  H/O dilation and curettage 2019    History of photorefractive keratectomy (PRK) 01/2018

## 2022-07-07 NOTE — H&P PST ADULT - NSANTHOSAYNRD_GEN_A_CORE
No. CRISTINO screening performed.  STOP BANG Legend: 0-2 = LOW Risk; 3-4 = INTERMEDIATE Risk; 5-8 = HIGH Risk

## 2022-07-07 NOTE — H&P PST ADULT - CARDIOVASCULAR
details… regular rate and rhythm/S1 S2 present/no gallops/no murmur/no JVD/normal PMI/no pedal edema

## 2022-07-07 NOTE — H&P PST ADULT - LYMPHATIC
posterior cervical L/posterior cervical R/anterior cervical L/anterior cervical R/supraclavicular L/supraclavicular R/No lymphadedenopathy

## 2022-07-07 NOTE — H&P PST ADULT - PROBLEM SELECTOR PLAN 1
Schedule for laparoscopic bilateral salpingectomy tentatively on 07/18/2022. Pre op instructions, famotidine, chlorhexidine gluconate soap given and explained. Pt verbalized understanding.  Covid test ordered.  Pt instructed to bring urine specimen on day of surgery, urine cup given to pt who verbalized understanding.

## 2022-07-07 NOTE — H&P PST ADULT - HISTORY OF PRESENT ILLNESS
37 y/o female with no significant PMHx: 3 months post partum, not currently breastfeeding 35 y/o female with no significant PMHx: 3 months post partum, not currently breastfeeding presents for pre op evaluation in preparation for laparoscopic bilateral salpingectomy

## 2022-07-17 ENCOUNTER — TRANSCRIPTION ENCOUNTER (OUTPATIENT)
Age: 37
End: 2022-07-17

## 2022-07-18 ENCOUNTER — RESULT REVIEW (OUTPATIENT)
Age: 37
End: 2022-07-18

## 2022-07-18 ENCOUNTER — TRANSCRIPTION ENCOUNTER (OUTPATIENT)
Age: 37
End: 2022-07-18

## 2022-07-18 ENCOUNTER — OUTPATIENT (OUTPATIENT)
Dept: OUTPATIENT SERVICES | Facility: HOSPITAL | Age: 37
LOS: 1 days | Discharge: HOME | End: 2022-07-18

## 2022-07-18 VITALS
RESPIRATION RATE: 16 BRPM | HEART RATE: 61 BPM | DIASTOLIC BLOOD PRESSURE: 81 MMHG | WEIGHT: 160.06 LBS | SYSTOLIC BLOOD PRESSURE: 118 MMHG | HEIGHT: 68 IN | OXYGEN SATURATION: 100 % | TEMPERATURE: 99 F

## 2022-07-18 VITALS
SYSTOLIC BLOOD PRESSURE: 128 MMHG | HEART RATE: 60 BPM | DIASTOLIC BLOOD PRESSURE: 79 MMHG | OXYGEN SATURATION: 100 % | RESPIRATION RATE: 16 BRPM

## 2022-07-18 DIAGNOSIS — Z98.890 OTHER SPECIFIED POSTPROCEDURAL STATES: Chronic | ICD-10-CM

## 2022-07-18 DIAGNOSIS — Z30.2 ENCOUNTER FOR STERILIZATION: ICD-10-CM

## 2022-07-18 LAB — HCG UR QL: NEGATIVE — SIGNIFICANT CHANGE UP

## 2022-07-18 PROCEDURE — 88302 TISSUE EXAM BY PATHOLOGIST: CPT | Mod: 26

## 2022-07-18 RX ORDER — SIMVASTATIN 20 MG/1
1 TABLET, FILM COATED ORAL
Qty: 0 | Refills: 0 | DISCHARGE

## 2022-07-18 RX ORDER — SULFACETAMIDE SODIUM/SULFUR 10-5%(W/W)
1 CREAM (GRAM) TOPICAL
Qty: 0 | Refills: 0 | DISCHARGE

## 2022-07-18 RX ORDER — IBUPROFEN 200 MG
3 TABLET ORAL
Qty: 0 | Refills: 0 | DISCHARGE

## 2022-07-18 RX ORDER — ACETAMINOPHEN 500 MG
3 TABLET ORAL
Qty: 0 | Refills: 0 | DISCHARGE

## 2022-07-18 NOTE — BRIEF OPERATIVE NOTE - OPERATION/FINDINGS
Uterus, bilateral fallopian tubes and ovaries wnl. Filmy adhesion noted in the posterior cul-de-sac.

## 2022-07-18 NOTE — ASU DISCHARGE PLAN (ADULT/PEDIATRIC) - ACTIVITY LEVEL
2 weeks/Weight bearing as tolerated/Nothing per vagina/No tub baths/No douching/No tampons/No intercourse

## 2022-07-18 NOTE — ASU DISCHARGE PLAN (ADULT/PEDIATRIC) - CARE PROVIDER_API CALL
Clary Tabor)  Obstetrics and Gynecology  [address], [address]  [place] [address]  Phone: [phone]  Fax: [phone]  Established Patient  Follow Up Time: 2 weeks

## 2022-07-18 NOTE — CHART NOTE - NSCHARTNOTEFT_GEN_A_CORE
POST-OP NOTE    JACLYN BRENNAN | [id number] | [facility] PRE SURG A5    Procedure: s/p Lsc BS    Subjective: Patient assessed at bedside. Pain well controlled. Tolerating PO fluids. Ambulating, not yet voiding.    Vital Signs Last 24 Hrs  T(C): 36.6 (18 Jul 2022 12:35), Max: 37.2 (18 Jul 2022 09:43)  T(F): 97.9 (18 Jul 2022 12:35), Max: 99 (18 Jul 2022 09:43)  HR: 60 (18 Jul 2022 13:45) (60 - 72)  BP: 132/77 (18 Jul 2022 13:45) (106/66 - 132/77)  BP(mean): 89 (18 Jul 2022 13:45) (65 - 89)  RR: 12 (18 Jul 2022 13:45) (12 - 20)  SpO2: 98% (18 Jul 2022 13:45) (98% - 100%)    Parameters below as of 18 Jul 2022 13:45  Patient On (Oxygen Delivery Method): room air      I&O's Summary               PHYSICAL EXAM:  Gen: NAD  Head: Normocephalic, atraumatic  Resp: breathing easily, no stridor  CV: RRR  Abdomen: soft, nontender, nondistended  Skin: Incision c/d/i. Normal color/turgor, no rashes or lesions    A/P: 37yo female s/p laparoscopic salpingectomy, ebl 5cc, doing well in the immediate post-op period:    Neuro: Pain well controlled. PO pain meds (tylenol, ibuprofen PRN).  CV: Hemodynamically stable.  Pulm: Saturating well on room air, encourage oob/amb, encourage use of incentive spirometry  GI: Continue regular diet  : DTV@8p.  Heme: Early ambulation and SCDs for DVT ppx  FEN: SLIV. replete electrolytes prn   ID: Afebrile  Endo: No active issues   Dispo: DTV->[facility] Discharge Planning.    Amyeo Afroz Jereen, PGY-2

## 2022-07-18 NOTE — ASU DISCHARGE PLAN (ADULT/PEDIATRIC) - NS MD DC FALL RISK RISK
For information on Fall & Injury Prevention, visit: [email or web address] OR  [email or web address] OR  https://www.cdc.gov/steadi/patient.html

## 2022-07-18 NOTE — ASU DISCHARGE PLAN (ADULT/PEDIATRIC) - ASU DC SPECIAL INSTRUCTIONSFT
Postoperative Instructions      Pain control     For pain control, take the followin. Motrin 600mg four times a day, take with food  2. Add Tylenol 975 four times a day, alternated with Motrin    Motrin and Tylenol can be obtained over the counter.      Postoperative restrictions   Do not drive or make important decisions for 24 hours after anesthesia. You may feel drowsy for 24 hours and should have a responsible adult with you during that time. Nothing in the vagina (tampons, sexual intercourse), No tub baths, pools or hot tubs for 8 weeks (showers are ok!). No lifting anything heavier than 15 lbs, no strenuous exercise for 8 weeks after surgery. Do not pull or cut any stitches that you see around your incision.         Signs of Infection  Some postoperative pain and discomfort is normal. However, if you experience any of the following, you may be developing an infection and should be seen by your doctor: pain that does not get better with the oral medications listed above, fever greater than 100.4F, foul smelling discharge coming from the surgical site, nausea and vomiting that does not stop (especially if you are unable to tolerate oral intake), or inability to urinate. If you experience any of these symptoms, call your doctor's office to be seen right away.    Follow Up  Attend your postoperative appointment with Dr. Tabor in 2 weeks.

## 2022-07-25 LAB — SURGICAL PATHOLOGY STUDY: SIGNIFICANT CHANGE UP

## 2022-07-28 ENCOUNTER — APPOINTMENT (OUTPATIENT)
Dept: INTERNAL MEDICINE | Facility: CLINIC | Age: 37
End: 2022-07-28

## 2022-07-28 PROCEDURE — 90739 HEPB VACC 2/4 DOSE ADULT IM: CPT

## 2022-07-28 PROCEDURE — G0010: CPT

## 2022-12-02 ENCOUNTER — RESULT REVIEW (OUTPATIENT)
Age: 37
End: 2022-12-02

## 2022-12-05 ENCOUNTER — OFFICE (OUTPATIENT)
Dept: URBAN - METROPOLITAN AREA CLINIC 27 | Facility: CLINIC | Age: 37
Setting detail: OPHTHALMOLOGY
End: 2022-12-05
Payer: COMMERCIAL

## 2022-12-05 DIAGNOSIS — H40.013: ICD-10-CM

## 2022-12-05 DIAGNOSIS — H10.45: ICD-10-CM

## 2022-12-05 DIAGNOSIS — D31.40: ICD-10-CM

## 2022-12-05 DIAGNOSIS — H11.153: ICD-10-CM

## 2022-12-05 PROCEDURE — 92133 CPTRZD OPH DX IMG PST SGM ON: CPT | Performed by: OPHTHALMOLOGY

## 2022-12-05 PROCEDURE — 92014 COMPRE OPH EXAM EST PT 1/>: CPT | Performed by: OPHTHALMOLOGY

## 2022-12-05 ASSESSMENT — PACHYMETRY
OD_CT_CORRECTION: 4
OS_CT_UM: 484
OD_CT_UM: 481
OS_CT_CORRECTION: 4

## 2022-12-05 ASSESSMENT — REFRACTION_AUTOREFRACTION
OD_SPHERE: -0.75
OD_AXIS: 14
OS_SPHERE: -1.00
OD_AXIS: 19
OD_SPHERE: -0.25
OS_SPHERE: -0.25
OS_CYLINDER: +0.75
OS_AXIS: 173
OD_CYLINDER: +0.25
OD_CYLINDER: +0.25
OS_CYLINDER: +0.50
OS_AXIS: 133

## 2022-12-05 ASSESSMENT — SPHEQUIV_DERIVED
OS_SPHEQUIV: -1.625
OS_SPHEQUIV: -0.625
OD_SPHEQUIV: -0.125
OS_SPHEQUIV: 0
OD_SPHEQUIV: -1.375
OD_SPHEQUIV: -0.625
OS_SPHEQUIV: -1.25

## 2022-12-05 ASSESSMENT — VISUAL ACUITY
OD_BCVA: 20/20
OS_BCVA: 20/20

## 2022-12-05 ASSESSMENT — REFRACTION_CURRENTRX
OD_AXIS: 023
OS_CYLINDER: +0.50
OS_VPRISM_DIRECTION: SV
OD_SPHERE: -0.75
OD_OVR_VA: 20/
OS_SPHERE: -1.25
OS_OVR_VA: 20/
OD_SPHERE: -1.00
OS_AXIS: 137
OD_VPRISM_DIRECTION: SV
OD_OVR_VA: 20/
OD_CYLINDER: SPHERE
OS_AXIS: 135
OS_CYLINDER: +0.75
OS_OVR_VA: 20/
OD_CYLINDER: +0.25
OS_SPHERE: -1.00

## 2022-12-05 ASSESSMENT — REFRACTION_MANIFEST
OS_CYLINDER: +0.25
OS_AXIS: 135
OD_SPHERE: -1.00
OD_SPHERE: -1.50
OS_SPHERE: -1.50
OS_AXIS: 115
OD_CYLINDER: SPHERE
OD_AXIS: 025
OD_CYLINDER: +0.25
OD_VA1: 20/15
OS_VA1: 20/15-2
OS_CYLINDER: +0.50
OD_VA1: 20/15
OS_SPHERE: -1.75
OS_VA1: 20/15

## 2022-12-05 ASSESSMENT — KERATOMETRY
OD_K2POWER_DIOPTERS: 44.50
OS_AXISANGLE_DEGREES: 3
OS_K2POWER_DIOPTERS: 44.25
OS_K1POWER_DIOPTERS: 44.00
OD_AXISANGLE_DEGREES: 90
OD_K1POWER_DIOPTERS: 44.50
METHOD_AUTO_MANUAL: AUTO

## 2022-12-05 ASSESSMENT — TONOMETRY
OS_IOP_MMHG: 13
OD_IOP_MMHG: 10
OD_IOP_MMHG: 14

## 2022-12-05 ASSESSMENT — AXIALLENGTH_DERIVED
OD_AL: 23.2775
OS_AL: 24.0025
OD_AL: 23.7615
OS_AL: 23.8522
OS_AL: 23.3645
OS_AL: 23.6058
OD_AL: 23.4687

## 2022-12-05 ASSESSMENT — CONFRONTATIONAL VISUAL FIELD TEST (CVF)
OS_FINDINGS: FULL
OD_FINDINGS: FULL

## 2023-01-25 RX ORDER — SIMVASTATIN 5 MG/1
5 TABLET, FILM COATED ORAL
Qty: 90 | Refills: 1 | Status: ACTIVE | COMMUNITY
Start: 2022-07-06 | End: 1900-01-01

## 2023-04-05 NOTE — OB NEONATOLOGY/PEDIATRICIAN DELIVERY SUMMARY - NSLABSCHECK_OBGYN_ALL_OB
Yes For information on Fall & Injury Prevention, visit: [email or web address] OR  [email or web address] OR  https://www.cdc.gov/steadi/patient.html

## 2023-04-06 ENCOUNTER — APPOINTMENT (OUTPATIENT)
Dept: ULTRASOUND IMAGING | Facility: IMAGING CENTER | Age: 38
End: 2023-04-06
Payer: COMMERCIAL

## 2023-04-06 ENCOUNTER — OUTPATIENT (OUTPATIENT)
Dept: OUTPATIENT SERVICES | Facility: HOSPITAL | Age: 38
LOS: 1 days | End: 2023-04-06
Payer: COMMERCIAL

## 2023-04-06 ENCOUNTER — APPOINTMENT (OUTPATIENT)
Dept: MAMMOGRAPHY | Facility: IMAGING CENTER | Age: 38
End: 2023-04-06
Payer: COMMERCIAL

## 2023-04-06 DIAGNOSIS — Z00.8 ENCOUNTER FOR OTHER GENERAL EXAMINATION: ICD-10-CM

## 2023-04-06 DIAGNOSIS — Z98.890 OTHER SPECIFIED POSTPROCEDURAL STATES: Chronic | ICD-10-CM

## 2023-04-06 PROCEDURE — 77063 BREAST TOMOSYNTHESIS BI: CPT | Mod: 26

## 2023-04-06 PROCEDURE — 77067 SCR MAMMO BI INCL CAD: CPT | Mod: 26

## 2023-04-06 PROCEDURE — 77063 BREAST TOMOSYNTHESIS BI: CPT

## 2023-04-06 PROCEDURE — 77067 SCR MAMMO BI INCL CAD: CPT

## 2023-04-14 ENCOUNTER — APPOINTMENT (OUTPATIENT)
Dept: ULTRASOUND IMAGING | Facility: IMAGING CENTER | Age: 38
End: 2023-04-14
Payer: COMMERCIAL

## 2023-04-14 ENCOUNTER — OUTPATIENT (OUTPATIENT)
Dept: OUTPATIENT SERVICES | Facility: HOSPITAL | Age: 38
LOS: 1 days | End: 2023-04-14
Payer: COMMERCIAL

## 2023-04-14 DIAGNOSIS — Z00.8 ENCOUNTER FOR OTHER GENERAL EXAMINATION: ICD-10-CM

## 2023-04-14 DIAGNOSIS — Z98.890 OTHER SPECIFIED POSTPROCEDURAL STATES: Chronic | ICD-10-CM

## 2023-04-14 PROCEDURE — 76642 ULTRASOUND BREAST LIMITED: CPT | Mod: 26,LT

## 2023-04-14 PROCEDURE — 76642 ULTRASOUND BREAST LIMITED: CPT

## 2023-06-06 ENCOUNTER — OFFICE (OUTPATIENT)
Dept: URBAN - METROPOLITAN AREA CLINIC 27 | Facility: CLINIC | Age: 38
Setting detail: OPHTHALMOLOGY
End: 2023-06-06
Payer: COMMERCIAL

## 2023-06-06 DIAGNOSIS — H10.45: ICD-10-CM

## 2023-06-06 DIAGNOSIS — D31.40: ICD-10-CM

## 2023-06-06 DIAGNOSIS — H52.13: ICD-10-CM

## 2023-06-06 DIAGNOSIS — H11.153: ICD-10-CM

## 2023-06-06 DIAGNOSIS — H40.013: ICD-10-CM

## 2023-06-06 DIAGNOSIS — H16.223: ICD-10-CM

## 2023-06-06 PROCEDURE — 92012 INTRM OPH EXAM EST PATIENT: CPT | Performed by: OPHTHALMOLOGY

## 2023-06-06 PROCEDURE — 92083 EXTENDED VISUAL FIELD XM: CPT | Performed by: OPHTHALMOLOGY

## 2023-06-06 ASSESSMENT — VISUAL ACUITY
OD_BCVA: 20/20
OS_BCVA: 20/20-1

## 2023-06-06 ASSESSMENT — REFRACTION_AUTOREFRACTION
OD_AXIS: 19
OS_SPHERE: -1.00
OD_AXIS: 174
OD_CYLINDER: +0.25
OS_AXIS: 133
OS_AXIS: 164
OS_CYLINDER: +0.75
OS_CYLINDER: +0.25
OD_SPHERE: -0.25
OS_SPHERE: -0.25
OD_SPHERE: -0.75
OD_CYLINDER: +0.25

## 2023-06-06 ASSESSMENT — SPHEQUIV_DERIVED
OD_SPHEQUIV: -1.375
OS_SPHEQUIV: -1.25
OD_SPHEQUIV: -0.625
OS_SPHEQUIV: -0.625
OS_SPHEQUIV: -1.625
OS_SPHEQUIV: -0.125
OD_SPHEQUIV: -0.125

## 2023-06-06 ASSESSMENT — REFRACTION_MANIFEST
OS_AXIS: 135
OD_CYLINDER: +0.25
OD_CYLINDER: SPHERE
OD_SPHERE: -1.00
OS_AXIS: 115
OS_CYLINDER: +0.25
OS_CYLINDER: +0.50
OS_SPHERE: -1.50
OS_SPHERE: -1.75
OD_AXIS: 025
OS_VA1: 20/15
OS_VA1: 20/15-2
OD_VA1: 20/15
OD_VA1: 20/15
OD_SPHERE: -1.50

## 2023-06-06 ASSESSMENT — TONOMETRY
OD_IOP_MMHG: 12
OD_IOP_MMHG: 11
OS_IOP_MMHG: 09
OS_IOP_MMHG: 12

## 2023-06-06 ASSESSMENT — KERATOMETRY
OD_K2POWER_DIOPTERS: 44.50
OS_K1POWER_DIOPTERS: 44.00
METHOD_AUTO_MANUAL: AUTO
OD_K1POWER_DIOPTERS: 44.50
OS_K2POWER_DIOPTERS: 44.25
OS_AXISANGLE_DEGREES: 159
OD_AXISANGLE_DEGREES: 90

## 2023-06-06 ASSESSMENT — AXIALLENGTH_DERIVED
OD_AL: 23.4687
OS_AL: 24.0025
OD_AL: 23.2775
OS_AL: 23.6058
OS_AL: 23.8522
OS_AL: 23.4124
OD_AL: 23.7615

## 2023-06-06 ASSESSMENT — REFRACTION_CURRENTRX
OS_OVR_VA: 20/
OD_SPHERE: -0.75
OS_CYLINDER: +0.75
OS_VPRISM_DIRECTION: SV
OS_AXIS: 135
OD_AXIS: 023
OD_CYLINDER: SPHERE
OS_OVR_VA: 20/
OD_CYLINDER: +0.25
OD_VPRISM_DIRECTION: SV
OD_SPHERE: -1.00
OS_CYLINDER: +0.50
OS_SPHERE: -1.00
OD_OVR_VA: 20/
OD_OVR_VA: 20/
OS_AXIS: 137
OS_SPHERE: -1.25

## 2023-06-06 ASSESSMENT — PACHYMETRY
OS_CT_UM: 484
OS_CT_CORRECTION: 4
OD_CT_UM: 481
OD_CT_CORRECTION: 4

## 2023-07-24 ENCOUNTER — APPOINTMENT (OUTPATIENT)
Dept: INTERNAL MEDICINE | Facility: CLINIC | Age: 38
End: 2023-07-24
Payer: COMMERCIAL

## 2023-07-24 VITALS
WEIGHT: 155 LBS | SYSTOLIC BLOOD PRESSURE: 117 MMHG | DIASTOLIC BLOOD PRESSURE: 75 MMHG | BODY MASS INDEX: 23.49 KG/M2 | HEIGHT: 68 IN | OXYGEN SATURATION: 97 % | HEART RATE: 76 BPM

## 2023-07-24 DIAGNOSIS — Z23 ENCOUNTER FOR IMMUNIZATION: ICD-10-CM

## 2023-07-24 DIAGNOSIS — D64.9 ANEMIA, UNSPECIFIED: ICD-10-CM

## 2023-07-24 DIAGNOSIS — E78.2 MIXED HYPERLIPIDEMIA: ICD-10-CM

## 2023-07-24 DIAGNOSIS — Z00.00 ENCOUNTER FOR GENERAL ADULT MEDICAL EXAMINATION W/OUT ABNORMAL FINDINGS: ICD-10-CM

## 2023-07-24 DIAGNOSIS — E55.9 VITAMIN D DEFICIENCY, UNSPECIFIED: ICD-10-CM

## 2023-07-24 DIAGNOSIS — Z13.1 ENCOUNTER FOR SCREENING FOR DIABETES MELLITUS: ICD-10-CM

## 2023-07-24 PROCEDURE — 99395 PREV VISIT EST AGE 18-39: CPT | Mod: 25

## 2023-07-24 PROCEDURE — 36415 COLL VENOUS BLD VENIPUNCTURE: CPT

## 2023-07-24 NOTE — HISTORY OF PRESENT ILLNESS
[FreeTextEntry1] : ANnual physical exam [de-identified] : Patient is 37 year female  with PMH o f Neutropenia, HSV type 2  , Hyperlipidemia  , Pre DM ,came today for annual physical exam\par \par

## 2023-07-24 NOTE — HEALTH RISK ASSESSMENT
[Good] : ~his/her~ current health as good [No] : In the past 12 months have you used drugs other than those required for medical reasons? No [No falls in past year] : Patient reported no falls in the past year [0] : 2) Feeling down, depressed, or hopeless: Not at all (0) [PHQ-2 Negative - No further assessment needed] : PHQ-2 Negative - No further assessment needed [No Retinopathy] : No retinopathy [Patient reported mammogram was normal] : Patient reported mammogram was normal [Patient reported PAP Smear was normal] : Patient reported PAP Smear was normal [HIV Test offered] : HIV Test offered [Hepatitis C test offered] : Hepatitis C test offered [None] : None [With Family] : lives with family [] :  [# Of Children ___] : has [unfilled] children [Sexually Active] : sexually active [Feels Safe at Home] : Feels safe at home [Fully functional (bathing, dressing, toileting, transferring, walking, feeding)] : Fully functional (bathing, dressing, toileting, transferring, walking, feeding) [Fully functional (using the telephone, shopping, preparing meals, housekeeping, doing laundry, using] : Fully functional and needs no help or supervision to perform IADLs (using the telephone, shopping, preparing meals, housekeeping, doing laundry, using transportation, managing medications and managing finances) [Smoke Detector] : smoke detector [Seat Belt] :  uses seat belt [Sunscreen] : uses sunscreen [Reviewed no changes] : Reviewed, no changes [Aggressive treatment] : aggressive treatment [Time Spent: ___ minutes] : Time Spent: [unfilled] minutes [Never] : Never [Audit-CScore] : 0 [de-identified] : walking [[id number]] : 0 [EyeExamDate] : 06/2023 [Change in mental status noted] : No change in mental status noted [Language] : denies difficulty with language [Handling Complex Tasks] : denies difficulty handling complex tasks [Reports changes in hearing] : Reports no changes in hearing [Reports changes in vision] : Reports no changes in vision [MammogramDate] : 05/2023 [MammogramComments] : as per patient was OK [PapSmearDate] : 11/2022 [HIVDate] : 06/29/2022 [HepatitisCDate] : 06/29/2022 [AdvancecareDate] : 007/24/2023

## 2023-07-25 LAB
25(OH)D3 SERPL-MCNC: 27.9 NG/ML
ALBUMIN SERPL ELPH-MCNC: 4.5 G/DL
ALP BLD-CCNC: 78 U/L
ALT SERPL-CCNC: 18 U/L
ANION GAP SERPL CALC-SCNC: 14 MMOL/L
AST SERPL-CCNC: 20 U/L
BILIRUB SERPL-MCNC: 0.2 MG/DL
BUN SERPL-MCNC: 7 MG/DL
CALCIUM SERPL-MCNC: 9.2 MG/DL
CHLORIDE SERPL-SCNC: 104 MMOL/L
CHOLEST SERPL-MCNC: 220 MG/DL
CO2 SERPL-SCNC: 20 MMOL/L
CREAT SERPL-MCNC: 0.77 MG/DL
EGFR: 101 ML/MIN/1.73M2
ESTIMATED AVERAGE GLUCOSE: 111 MG/DL
FOLATE SERPL-MCNC: 16.9 NG/ML
GLUCOSE SERPL-MCNC: 86 MG/DL
HBA1C MFR BLD HPLC: 5.5 %
HBV SURFACE AB SER QL: REACTIVE
HBV SURFACE AG SER QL: NONREACTIVE
HCT VFR BLD CALC: 36.9 %
HCV AB SER QL: NONREACTIVE
HCV S/CO RATIO: 0.18 S/CO
HDLC SERPL-MCNC: 52 MG/DL
HGB BLD-MCNC: 11.4 G/DL
HIV1+2 AB SPEC QL IA.RAPID: NONREACTIVE
IRON SERPL-MCNC: 67 UG/DL
LDLC SERPL CALC-MCNC: 153 MG/DL
MCHC RBC-ENTMCNC: 28.4 PG
MCHC RBC-ENTMCNC: 30.9 GM/DL
MCV RBC AUTO: 92 FL
NONHDLC SERPL-MCNC: 168 MG/DL
PLATELET # BLD AUTO: 280 K/UL
POTASSIUM SERPL-SCNC: 4.5 MMOL/L
PROT SERPL-MCNC: 7.2 G/DL
RBC # BLD: 4.01 M/UL
RBC # FLD: 13.5 %
SODIUM SERPL-SCNC: 138 MMOL/L
T PALLIDUM AB SER QL IA: NEGATIVE
TRIGL SERPL-MCNC: 84 MG/DL
TSH SERPL-ACNC: 0.86 UIU/ML
VIT B12 SERPL-MCNC: 461 PG/ML
WBC # FLD AUTO: 3.94 K/UL

## 2023-07-26 ENCOUNTER — NON-APPOINTMENT (OUTPATIENT)
Age: 38
End: 2023-07-26

## 2024-02-12 ENCOUNTER — LABORATORY RESULT (OUTPATIENT)
Age: 39
End: 2024-02-12

## 2024-02-12 ENCOUNTER — APPOINTMENT (OUTPATIENT)
Dept: INTERNAL MEDICINE | Facility: CLINIC | Age: 39
End: 2024-02-12
Payer: COMMERCIAL

## 2024-02-12 VITALS
HEIGHT: 68 IN | WEIGHT: 166 LBS | DIASTOLIC BLOOD PRESSURE: 76 MMHG | TEMPERATURE: 98 F | OXYGEN SATURATION: 99 % | SYSTOLIC BLOOD PRESSURE: 125 MMHG | HEART RATE: 80 BPM | BODY MASS INDEX: 25.16 KG/M2

## 2024-02-12 DIAGNOSIS — R53.83 OTHER FATIGUE: ICD-10-CM

## 2024-02-12 DIAGNOSIS — M25.532 PAIN IN LEFT WRIST: ICD-10-CM

## 2024-02-12 DIAGNOSIS — R73.03 PREDIABETES.: ICD-10-CM

## 2024-02-12 PROCEDURE — 99214 OFFICE O/P EST MOD 30 MIN: CPT

## 2024-02-12 NOTE — HISTORY OF PRESENT ILLNESS
[FreeTextEntry8] : 38F is coming in for L wrist pain and fatigue.   Has had L wrist pain for 10 days. No inciting trigger or injury. Pain is described as "electric shock." She has carpel tunnel on her R wrist, and says pain feels different.  Has had more fatigue since her cycle in January.

## 2024-02-12 NOTE — PHYSICAL EXAM
[No Respiratory Distress] : no respiratory distress  [No Accessory Muscle Use] : no accessory muscle use [Normal] : affect was normal and insight and judgment were intact [de-identified] : No significant TTP of L wrist, upon flexion or extension

## 2024-02-12 NOTE — ASSESSMENT
[FreeTextEntry1] : 38F is coming in for L wrist pain and fatigue.   #L wrist pain -Unclear etiology currently -PT referral given -Recommended wrist splint -If pain persists after above, will consider ORtho or Sports medicine referral  #Fatigue -CBC, Iron w/ TIBC, ferritin, B12 and folate, TSH, CMP, Mg ordered   RTC in 6 weeks

## 2024-02-16 LAB
ALBUMIN SERPL ELPH-MCNC: 4.4 G/DL
ALP BLD-CCNC: 76 U/L
ALT SERPL-CCNC: 18 U/L
ANION GAP SERPL CALC-SCNC: 12 MMOL/L
AST SERPL-CCNC: 18 U/L
BILIRUB SERPL-MCNC: 0.2 MG/DL
BUN SERPL-MCNC: 9 MG/DL
CALCIUM SERPL-MCNC: 9.1 MG/DL
CHLORIDE SERPL-SCNC: 104 MMOL/L
CO2 SERPL-SCNC: 22 MMOL/L
CREAT SERPL-MCNC: 0.83 MG/DL
EGFR: 91 ML/MIN/1.73M2
ESTIMATED AVERAGE GLUCOSE: 120 MG/DL
FERRITIN SERPL-MCNC: 50 NG/ML
FOLATE SERPL-MCNC: 10.7 NG/ML
GLUCOSE SERPL-MCNC: 123 MG/DL
HBA1C MFR BLD HPLC: 5.8 %
HCT VFR BLD CALC: 36.7 %
HGB BLD-MCNC: 11.7 G/DL
IRON SATN MFR SERPL: 17 %
IRON SERPL-MCNC: 58 UG/DL
MAGNESIUM SERPL-MCNC: 1.9 MG/DL
MCHC RBC-ENTMCNC: 29.1 PG
MCHC RBC-ENTMCNC: 31.9 GM/DL
MCV RBC AUTO: 91.3 FL
PLATELET # BLD AUTO: 233 K/UL
POTASSIUM SERPL-SCNC: 3.8 MMOL/L
PROT SERPL-MCNC: 7.1 G/DL
RBC # BLD: 4.02 M/UL
RBC # FLD: 12.9 %
SODIUM SERPL-SCNC: 139 MMOL/L
TIBC SERPL-MCNC: 345 UG/DL
UIBC SERPL-MCNC: 287 UG/DL
VIT B12 SERPL-MCNC: 704 PG/ML
WBC # FLD AUTO: 4.34 K/UL

## 2024-04-29 ENCOUNTER — OUTPATIENT (OUTPATIENT)
Dept: OUTPATIENT SERVICES | Facility: HOSPITAL | Age: 39
LOS: 1 days | End: 2024-04-29
Payer: COMMERCIAL

## 2024-04-29 ENCOUNTER — APPOINTMENT (OUTPATIENT)
Dept: MAMMOGRAPHY | Facility: IMAGING CENTER | Age: 39
End: 2024-04-29
Payer: COMMERCIAL

## 2024-04-29 ENCOUNTER — APPOINTMENT (OUTPATIENT)
Dept: ULTRASOUND IMAGING | Facility: IMAGING CENTER | Age: 39
End: 2024-04-29
Payer: COMMERCIAL

## 2024-04-29 DIAGNOSIS — Z98.890 OTHER SPECIFIED POSTPROCEDURAL STATES: Chronic | ICD-10-CM

## 2024-04-29 DIAGNOSIS — Z00.8 ENCOUNTER FOR OTHER GENERAL EXAMINATION: ICD-10-CM

## 2024-04-29 PROCEDURE — 77067 SCR MAMMO BI INCL CAD: CPT | Mod: 26

## 2024-04-29 PROCEDURE — 76641 ULTRASOUND BREAST COMPLETE: CPT | Mod: 26,50

## 2024-04-29 PROCEDURE — 77063 BREAST TOMOSYNTHESIS BI: CPT | Mod: 26

## 2024-04-29 PROCEDURE — 77063 BREAST TOMOSYNTHESIS BI: CPT

## 2024-04-29 PROCEDURE — 76641 ULTRASOUND BREAST COMPLETE: CPT

## 2024-04-29 PROCEDURE — 77067 SCR MAMMO BI INCL CAD: CPT

## 2024-06-26 ENCOUNTER — OFFICE (OUTPATIENT)
Dept: URBAN - METROPOLITAN AREA CLINIC 27 | Facility: CLINIC | Age: 39
Setting detail: OPHTHALMOLOGY
End: 2024-06-26
Payer: COMMERCIAL

## 2024-06-26 DIAGNOSIS — H10.45: ICD-10-CM

## 2024-06-26 DIAGNOSIS — H40.013: ICD-10-CM

## 2024-06-26 DIAGNOSIS — D31.40: ICD-10-CM

## 2024-06-26 DIAGNOSIS — H11.153: ICD-10-CM

## 2024-06-26 DIAGNOSIS — H16.223: ICD-10-CM

## 2024-06-26 PROCEDURE — 92133 CPTRZD OPH DX IMG PST SGM ON: CPT | Performed by: OPHTHALMOLOGY

## 2024-06-26 PROCEDURE — 92014 COMPRE OPH EXAM EST PT 1/>: CPT | Performed by: OPHTHALMOLOGY

## 2024-06-26 ASSESSMENT — CONFRONTATIONAL VISUAL FIELD TEST (CVF)
OS_FINDINGS: FULL
OD_FINDINGS: FULL

## 2024-07-29 ENCOUNTER — APPOINTMENT (OUTPATIENT)
Dept: INTERNAL MEDICINE | Facility: CLINIC | Age: 39
End: 2024-07-29
Payer: COMMERCIAL

## 2024-07-29 ENCOUNTER — NON-APPOINTMENT (OUTPATIENT)
Age: 39
End: 2024-07-29

## 2024-07-29 VITALS
DIASTOLIC BLOOD PRESSURE: 68 MMHG | OXYGEN SATURATION: 98 % | WEIGHT: 163 LBS | BODY MASS INDEX: 24.71 KG/M2 | HEIGHT: 68 IN | HEART RATE: 71 BPM | SYSTOLIC BLOOD PRESSURE: 118 MMHG

## 2024-07-29 DIAGNOSIS — R06.02 SHORTNESS OF BREATH: ICD-10-CM

## 2024-07-29 DIAGNOSIS — E55.9 VITAMIN D DEFICIENCY, UNSPECIFIED: ICD-10-CM

## 2024-07-29 DIAGNOSIS — R20.2 PARESTHESIA OF SKIN: ICD-10-CM

## 2024-07-29 DIAGNOSIS — D64.9 ANEMIA, UNSPECIFIED: ICD-10-CM

## 2024-07-29 DIAGNOSIS — Z92.29 PERSONAL HISTORY OF OTHER DRUG THERAPY: ICD-10-CM

## 2024-07-29 DIAGNOSIS — R53.83 OTHER FATIGUE: ICD-10-CM

## 2024-07-29 DIAGNOSIS — R73.03 PREDIABETES.: ICD-10-CM

## 2024-07-29 DIAGNOSIS — R42 DIZZINESS AND GIDDINESS: ICD-10-CM

## 2024-07-29 DIAGNOSIS — E78.2 MIXED HYPERLIPIDEMIA: ICD-10-CM

## 2024-07-29 DIAGNOSIS — Z00.00 ENCOUNTER FOR GENERAL ADULT MEDICAL EXAMINATION W/OUT ABNORMAL FINDINGS: ICD-10-CM

## 2024-07-29 PROCEDURE — 93000 ELECTROCARDIOGRAM COMPLETE: CPT

## 2024-07-29 PROCEDURE — 99396 PREV VISIT EST AGE 40-64: CPT

## 2024-07-29 PROCEDURE — 36415 COLL VENOUS BLD VENIPUNCTURE: CPT

## 2024-07-29 PROCEDURE — 99213 OFFICE O/P EST LOW 20 MIN: CPT | Mod: 25

## 2024-07-29 NOTE — HISTORY OF PRESENT ILLNESS
[FreeTextEntry1] : ANnual physical exam [de-identified] : Patient is 38 year female  with PMH o f Neutropenia, HSV type 2  , Hyperlipidemia  , Pre DM ,came today for annual physical exam

## 2024-07-29 NOTE — HISTORY OF PRESENT ILLNESS
[FreeTextEntry1] : ANnual physical exam [de-identified] : Patient is 38 year female  with PMH o f Neutropenia, HSV type 2  , Hyperlipidemia  , Pre DM ,came today for annual physical exam

## 2024-07-29 NOTE — HEALTH RISK ASSESSMENT
[Good] : ~his/her~ current health as good [No] : In the past 12 months have you used drugs other than those required for medical reasons? No [No falls in past year] : Patient reported no falls in the past year [0] : 2) Feeling down, depressed, or hopeless: Not at all (0) [PHQ-2 Negative - No further assessment needed] : PHQ-2 Negative - No further assessment needed [Never] : Never [No Retinopathy] : No retinopathy [Patient reported mammogram was normal] : Patient reported mammogram was normal [Patient reported PAP Smear was normal] : Patient reported PAP Smear was normal [HIV Test offered] : HIV Test offered [Hepatitis C test offered] : Hepatitis C test offered [None] : None [With Family] : lives with family [] :  [# Of Children ___] : has [unfilled] children [Sexually Active] : sexually active [Feels Safe at Home] : Feels safe at home [Fully functional (bathing, dressing, toileting, transferring, walking, feeding)] : Fully functional (bathing, dressing, toileting, transferring, walking, feeding) [Fully functional (using the telephone, shopping, preparing meals, housekeeping, doing laundry, using] : Fully functional and needs no help or supervision to perform IADLs (using the telephone, shopping, preparing meals, housekeeping, doing laundry, using transportation, managing medications and managing finances) [Smoke Detector] : smoke detector [Seat Belt] :  uses seat belt [Sunscreen] : uses sunscreen [Reviewed no changes] : Reviewed, no changes [Aggressive treatment] : aggressive treatment [Time Spent: ___ minutes] : Time Spent: [unfilled] minutes [Little interest or pleasure doing things] : 1) Little interest or pleasure doing things [Feeling down, depressed, or hopeless] : 2) Feeling down, depressed, or hopeless [Audit-CScore] : 0 [de-identified] : walking [[id number]] : 0 [EyeExamDate] : 06/2023 [Change in mental status noted] : No change in mental status noted [Language] : denies difficulty with language [Handling Complex Tasks] : denies difficulty handling complex tasks [Reports changes in hearing] : Reports no changes in hearing [Reports changes in vision] : Reports no changes in vision [MammogramDate] : 05/2023 [MammogramComments] : as per patient was OK [PapSmearDate] : 11/2022 [HIVDate] : 06/29/2022 [HepatitisCDate] : 06/29/2022 [AdvancecareDate] : 007/24/2023

## 2024-07-29 NOTE — HEALTH RISK ASSESSMENT
[Good] : ~his/her~ current health as good [No] : In the past 12 months have you used drugs other than those required for medical reasons? No [No falls in past year] : Patient reported no falls in the past year [0] : 2) Feeling down, depressed, or hopeless: Not at all (0) [PHQ-2 Negative - No further assessment needed] : PHQ-2 Negative - No further assessment needed [Never] : Never [No Retinopathy] : No retinopathy [Patient reported mammogram was normal] : Patient reported mammogram was normal [Patient reported PAP Smear was normal] : Patient reported PAP Smear was normal [HIV Test offered] : HIV Test offered [Hepatitis C test offered] : Hepatitis C test offered [None] : None [With Family] : lives with family [] :  [# Of Children ___] : has [unfilled] children [Sexually Active] : sexually active [Feels Safe at Home] : Feels safe at home [Fully functional (bathing, dressing, toileting, transferring, walking, feeding)] : Fully functional (bathing, dressing, toileting, transferring, walking, feeding) [Fully functional (using the telephone, shopping, preparing meals, housekeeping, doing laundry, using] : Fully functional and needs no help or supervision to perform IADLs (using the telephone, shopping, preparing meals, housekeeping, doing laundry, using transportation, managing medications and managing finances) [Smoke Detector] : smoke detector [Seat Belt] :  uses seat belt [Sunscreen] : uses sunscreen [Reviewed no changes] : Reviewed, no changes [Aggressive treatment] : aggressive treatment [Time Spent: ___ minutes] : Time Spent: [unfilled] minutes [Little interest or pleasure doing things] : 1) Little interest or pleasure doing things [Feeling down, depressed, or hopeless] : 2) Feeling down, depressed, or hopeless [Audit-CScore] : 0 [de-identified] : walking [[id number]] : 0 [EyeExamDate] : 06/2023 [Change in mental status noted] : No change in mental status noted [Language] : denies difficulty with language [Handling Complex Tasks] : denies difficulty handling complex tasks [Reports changes in hearing] : Reports no changes in hearing [Reports changes in vision] : Reports no changes in vision [MammogramDate] : 05/2023 [MammogramComments] : as per patient was OK [PapSmearDate] : 11/2022 [HIVDate] : 06/29/2022 [HepatitisCDate] : 06/29/2022 [AdvancecareDate] : 007/24/2023

## 2024-07-30 LAB
25(OH)D3 SERPL-MCNC: 22.4 NG/ML
ALBUMIN SERPL ELPH-MCNC: 4.2 G/DL
ALP BLD-CCNC: 70 U/L
ALT SERPL-CCNC: 13 U/L
ANION GAP SERPL CALC-SCNC: 11 MMOL/L
AST SERPL-CCNC: 14 U/L
BILIRUB SERPL-MCNC: 0.3 MG/DL
BUN SERPL-MCNC: 8 MG/DL
CALCIUM SERPL-MCNC: 8.8 MG/DL
CHLORIDE SERPL-SCNC: 106 MMOL/L
CHOLEST SERPL-MCNC: 195 MG/DL
CO2 SERPL-SCNC: 22 MMOL/L
CREAT SERPL-MCNC: 0.78 MG/DL
EGFR: 98 ML/MIN/1.73M2
ESTIMATED AVERAGE GLUCOSE: 120 MG/DL
GLUCOSE SERPL-MCNC: 96 MG/DL
HBA1C MFR BLD HPLC: 5.8 %
HCT VFR BLD CALC: 36.4 %
HDLC SERPL-MCNC: 47 MG/DL
HGB BLD-MCNC: 11.7 G/DL
LDLC SERPL CALC-MCNC: 129 MG/DL
MCHC RBC-ENTMCNC: 29 PG
MCHC RBC-ENTMCNC: 32.1 GM/DL
MCV RBC AUTO: 90.3 FL
NONHDLC SERPL-MCNC: 148 MG/DL
PLATELET # BLD AUTO: 238 K/UL
POTASSIUM SERPL-SCNC: 4.2 MMOL/L
PROT SERPL-MCNC: 7.1 G/DL
RBC # BLD: 4.03 M/UL
RBC # FLD: 14.1 %
SODIUM SERPL-SCNC: 139 MMOL/L
TRIGL SERPL-MCNC: 105 MG/DL
TSH SERPL-ACNC: 1.94 UIU/ML
WBC # FLD AUTO: 4.34 K/UL

## 2024-08-10 NOTE — OB NEONATOLOGY/PEDIATRICIAN DELIVERY SUMMARY - NSPHYSEXAMA_OBGYN_ALL_OB
"  Nephrology Progress Note  08/10/2024            ASSESSMENT AND RECOMMENDATIONS:   Harry C Cushing is a 63 year old year old male with PMHx most significant for ESKD on iHD MWF via R AVF complicated by steal syndrome/digital ischemia, DMT2, CAD, HTN, Pulmonary HTN,  who presented on 8/9/2024 as direct admission for volume optimization. Nephrology consulted to assist with iHD while patient is in house.      #End Stage Kidney Disease on RRT  Outpt Schedule: MWF  Dialysis Access: Right AVF  Last Session Prior to Admit: 8/9/2024  EDW: 93kg  Date of Last Consent for RRT: Chronic iHD patient. No new consent needed.    Recommendations:  # ESKD: Will plan on next iHD session 8/10/2024  # Blood Pressure: Coreg 12.5mg BID.  Usual intra dialytic weight gain  ~ 3kg. Typically has hypotension with UF on iHD. Severe pulmonary hypertension on Right heart cath n 1/2024. Which is partly believed to be because of volume overload.   - Unable to to challenge dry weight with regular schedule of HD due to severe cramp and hypotension.  - Alterate isolated UF and dialysis  daily with the aim more UF and challenging dry weight  - Isolated UF of 2.5 L over 2.5 hours  - HD with UF 2.0L tomorrow  # MBD: Continue PhosLo 667mg TID w/ meals  #Anemia: On Mircera. Last dose was week of 7/29 so he currently has appropriate \"EPO\" coverage.   -Other:   # Steal Syndrome and Digit Ischemia: Has previously seen vascular surgery, Dr Borjas on 6/24/2024, at which time fistula ligation was recommended. Per documentation 6/24/2024 and per charted phone conversation 7/8/2024 -> patient has declined intervention.     -We recommend Daily Nephrocap (replaces water soluble vitamins lost with HD) in all patients on RRT  -Avoid Lovenox, Gadolinium (MRI contrast), Morphine, Magnesium or Phos containing enemas    Recommendations were communicated to primary team via note    Seen and discussed with Dr. Mark Cannon MD   Division of Renal Disease and " "Hypertension  [employer]  [facility] Web Console      Interval History :   Nursing and provider notes from last 24 hours reviewed.  In the last 24 hours Harry C Cushing no acute events. BP measured on the wrist and leg reported low however patient asymptomatic. Unable to take arm BP ( AVF on right and picc line on the left).    Physical Exam:   I/O last 3 completed shifts:  In: 240 [P.O.:240]  Out: -    BP (!) 122/103 (BP Location: Right leg)   Pulse 71   Temp 98.4  F (36.9  C) (Oral)   Resp 16   Ht 1.82 m (5' 11.65\")   Wt 93.4 kg (205 lb 12.8 oz)   SpO2 97%   BMI 28.18 kg/m       GENERAL APPEARANCE: no distress, alert and awake  EYES: no scleral icterus, pupils equal  CV: regular rhythm, normal rate  GI: soft, nontender, normal bowel sounds  MS: no evidence of inflammation in joints, no muscle tenderness  : no jj  SKIN: no rash, warm, dry, no cyanosis  NEURO: face symmetric, no asterixis    Labs:   All labs reviewed by me  Electrolytes/Renal -   Recent Labs   Lab Test 08/09/24  1456 05/09/24  0942 03/19/24  1108 01/18/24  0821 09/21/21  0917 05/14/21  0717 05/13/21  1114 04/27/21  1323 03/31/21  0712 03/30/21  0554 02/23/21  2032 02/23/21  0523    134*  --  133*   < > 138   < > 140   < > 139   < > 135   POTASSIUM 4.0 4.9  --  4.2   < > 4.7   < > 3.7   < > 4.6   < > 3.6   CHLORIDE 94* 92*  --  92*   < > 104   < > 105   < > 103   < > 102   CO2 29 25  --  26   < > 30   < > 34*   < > 28   < > 22   BUN 29.1* 40.3*  --  39.8*   < > 37*   < > 26   < > 64*   < > 126*   CR 4.79* 7.28* 7.11* 7.24*   < > 3.80*   < > 3.01*   < > 3.86*   < > 5.12*   * 137*  --  85   < > 121*   < > 168*   < > 96   < > 218*   MC 9.4 9.5  --  9.0   < > 9.1   < > 9.0   < > 8.6   < > 9.0   MAG 2.3  --   --   --   --   --   --  1.9  --  1.9   < > 2.7*   PHOS  --   --   --   --   --  4.8*  --  3.2  --   --   --  4.8*    < > = values in this interval not displayed.       CBC -   Recent Labs   Lab Test 08/09/24  1453 " 05/09/24  0942 01/18/24  0821   WBC 7.4 6.1 5.9   HGB 11.0* 10.0* 11.0*   * 130* 138*       LFTs -   Recent Labs   Lab Test 08/09/24  1456 05/09/24  0942 08/23/23  1447   ALKPHOS 102 119 163*   BILITOTAL 0.3 0.4 0.7   ALT 33 31 59   AST 30 26 37   PROTTOTAL 7.3 7.8 7.5   ALBUMIN 4.4 4.7 4.6       Iron Panel -   Recent Labs   Lab Test 01/22/21  1052 01/14/21  1733 11/18/20  1228   IRON 40 59 45   IRONSAT 16 23 17   RADHA 645* 628* 302         Imaging:  All imaging studies reviewed by me.     Current Medications:  Current Facility-Administered Medications   Medication Dose Route Frequency Provider Last Rate Last Admin    apixaban ANTICOAGULANT (ELIQUIS) tablet 2.5 mg  2.5 mg Oral BID Miguelito Calhoun MD   2.5 mg at 08/09/24 2036    calcium acetate (PHOSLO) capsule 667 mg  667 mg Oral TID w/meals Miguelito Calhoun MD   667 mg at 08/09/24 1900    carvedilol (COREG) tablet 12.5 mg  12.5 mg Oral BID w/meals Miguelito Calhoun MD        cholecalciferol (VITAMIN D3) tablet 10 mcg  10 mcg Oral Daily Miguelito Calhoun MD        hydrocortisone 2.5 % cream   Topical BID Miguelito Calhoun MD        multivitamin RENAL (RENAVITE RX/NEPHROVITE) tablet 1 tablet  1 tablet Oral Daily Miguelito Calhoun MD        sildenafil (REVATIO) tablet 20 mg  20 mg Oral TID Miguelito Calhoun MD   20 mg at 08/09/24 2036    sodium chloride (PF) 0.9% PF flush 3 mL  3 mL Intracatheter Q8H BasilioCristopher Tinajero MD   3 mL at 08/10/24 0601    sulfamethoxazole-trimethoprim (BACTRIM DS) 800-160 MG per tablet 1 tablet  1 tablet Oral BID Miguelito Calhoun MD        Treprostinil (TYVASO DPI) inhalation powder 64 mcg  64 mcg Inhalation BID Miguelito Calhoun MD   64 mcg at 08/09/24 2035     Current Facility-Administered Medications   Medication Dose Route Frequency Provider Last Rate Last Admin    medication instruction   Does not apply Continuous  Cristopher Will MD Momina Ahmed, MD    Remarkable

## 2024-09-12 ENCOUNTER — APPOINTMENT (OUTPATIENT)
Dept: CARDIOLOGY | Facility: CLINIC | Age: 39
End: 2024-09-12
Payer: COMMERCIAL

## 2024-09-12 VITALS
WEIGHT: 163 LBS | HEIGHT: 68 IN | DIASTOLIC BLOOD PRESSURE: 74 MMHG | BODY MASS INDEX: 24.71 KG/M2 | SYSTOLIC BLOOD PRESSURE: 122 MMHG | OXYGEN SATURATION: 98 % | HEART RATE: 80 BPM

## 2024-09-12 DIAGNOSIS — R42 DIZZINESS AND GIDDINESS: ICD-10-CM

## 2024-09-12 DIAGNOSIS — R06.09 OTHER FORMS OF DYSPNEA: ICD-10-CM

## 2024-09-12 DIAGNOSIS — E78.2 MIXED HYPERLIPIDEMIA: ICD-10-CM

## 2024-09-12 PROCEDURE — 99203 OFFICE O/P NEW LOW 30 MIN: CPT

## 2024-09-12 PROCEDURE — G2211 COMPLEX E/M VISIT ADD ON: CPT | Mod: NC

## 2024-09-12 NOTE — HISTORY OF PRESENT ILLNESS
[FreeTextEntry1] : 38F with preDM, borderline lipids here for initial cardiac evaluation, lightheadedness   Reports intermittent episodes of lightheadedness x 9M No clear triggers, feels better when she sits and relaxes  Dyspnea upon heavy exertion, palpitations when anxious  No formal exercise  A1c 5.8%,  (down from 153)  Never smoker. Father - DM. Maternal GM - CVA, HTN. Works in sales/manufacturing.   ECG: SR, no ST-T wave changes

## 2024-09-12 NOTE — DISCUSSION/SUMMARY
[FreeTextEntry1] : 38F preDM, borderline lipids with lightheadedness, BENOIT  preDM: A1c 5.8%, continue to trend with PCP. Low sugar intake.   Borderline lipids: , improved from last check. Continue with healthy lifestyle choices.   Lightheadedness/BENOIT: Will arrange for transthoracic echo to ensure normal left ventricular size and function and normal valvular function. ECG normal. Can consider ZIO to rule out arrhythmia if symptoms persist.   RV 6M

## 2024-10-03 NOTE — OB RN DELIVERY SUMMARY - NS_NEWBORNAALIVE_OBGYN_ALL_OB
Introductory call placed to Ms. Nath. Introduced myself and navigation services. She stated she is aware of appointment time and place. She has no immediate needs at this time. Will meet her at her surgery appointment Oct 7th.   
Yes

## 2024-10-17 ENCOUNTER — APPOINTMENT (OUTPATIENT)
Dept: INTERNAL MEDICINE | Facility: CLINIC | Age: 39
End: 2024-10-17

## 2024-10-17 PROCEDURE — 93306 TTE W/DOPPLER COMPLETE: CPT

## 2025-02-28 ENCOUNTER — NON-APPOINTMENT (OUTPATIENT)
Age: 40
End: 2025-02-28

## 2025-04-29 ENCOUNTER — APPOINTMENT (OUTPATIENT)
Dept: MAMMOGRAPHY | Facility: IMAGING CENTER | Age: 40
End: 2025-04-29
Payer: COMMERCIAL

## 2025-04-29 ENCOUNTER — APPOINTMENT (OUTPATIENT)
Dept: ULTRASOUND IMAGING | Facility: IMAGING CENTER | Age: 40
End: 2025-04-29
Payer: COMMERCIAL

## 2025-04-29 ENCOUNTER — OUTPATIENT (OUTPATIENT)
Dept: OUTPATIENT SERVICES | Facility: HOSPITAL | Age: 40
LOS: 1 days | End: 2025-04-29
Payer: COMMERCIAL

## 2025-04-29 DIAGNOSIS — Z98.890 OTHER SPECIFIED POSTPROCEDURAL STATES: Chronic | ICD-10-CM

## 2025-04-29 DIAGNOSIS — Z00.8 ENCOUNTER FOR OTHER GENERAL EXAMINATION: ICD-10-CM

## 2025-04-29 PROCEDURE — 77063 BREAST TOMOSYNTHESIS BI: CPT

## 2025-04-29 PROCEDURE — 76641 ULTRASOUND BREAST COMPLETE: CPT | Mod: 26,50

## 2025-04-29 PROCEDURE — 76641 ULTRASOUND BREAST COMPLETE: CPT

## 2025-04-29 PROCEDURE — 77067 SCR MAMMO BI INCL CAD: CPT

## 2025-04-29 PROCEDURE — 77063 BREAST TOMOSYNTHESIS BI: CPT | Mod: 26

## 2025-04-29 PROCEDURE — 77067 SCR MAMMO BI INCL CAD: CPT | Mod: 26

## 2025-05-03 ENCOUNTER — EMERGENCY (EMERGENCY)
Facility: HOSPITAL | Age: 40
LOS: 1 days | End: 2025-05-03
Attending: EMERGENCY MEDICINE | Admitting: EMERGENCY MEDICINE
Payer: COMMERCIAL

## 2025-05-03 VITALS
HEART RATE: 101 BPM | DIASTOLIC BLOOD PRESSURE: 73 MMHG | WEIGHT: 160.94 LBS | TEMPERATURE: 98 F | SYSTOLIC BLOOD PRESSURE: 116 MMHG | OXYGEN SATURATION: 100 % | RESPIRATION RATE: 16 BRPM

## 2025-05-03 DIAGNOSIS — Z98.890 OTHER SPECIFIED POSTPROCEDURAL STATES: Chronic | ICD-10-CM

## 2025-05-03 LAB
ALBUMIN SERPL ELPH-MCNC: 4 G/DL — SIGNIFICANT CHANGE UP (ref 3.3–5)
ALP SERPL-CCNC: 80 U/L — SIGNIFICANT CHANGE UP (ref 40–120)
ALT FLD-CCNC: 14 U/L — SIGNIFICANT CHANGE UP (ref 4–33)
ANION GAP SERPL CALC-SCNC: 12 MMOL/L — SIGNIFICANT CHANGE UP (ref 7–14)
AST SERPL-CCNC: 30 U/L — SIGNIFICANT CHANGE UP (ref 4–32)
BILIRUB SERPL-MCNC: <0.2 MG/DL — SIGNIFICANT CHANGE UP (ref 0.2–1.2)
BUN SERPL-MCNC: 10 MG/DL — SIGNIFICANT CHANGE UP (ref 7–23)
CALCIUM SERPL-MCNC: 9.3 MG/DL — SIGNIFICANT CHANGE UP (ref 8.4–10.5)
CHLORIDE SERPL-SCNC: 102 MMOL/L — SIGNIFICANT CHANGE UP (ref 98–107)
CO2 SERPL-SCNC: 23 MMOL/L — SIGNIFICANT CHANGE UP (ref 22–31)
CREAT SERPL-MCNC: 0.63 MG/DL — SIGNIFICANT CHANGE UP (ref 0.5–1.3)
EGFR: 114 ML/MIN/1.73M2 — SIGNIFICANT CHANGE UP
EGFR: 114 ML/MIN/1.73M2 — SIGNIFICANT CHANGE UP
GLUCOSE SERPL-MCNC: 92 MG/DL — SIGNIFICANT CHANGE UP (ref 70–99)
HCG SERPL-ACNC: <1 MIU/ML — SIGNIFICANT CHANGE UP
POTASSIUM SERPL-MCNC: 4.8 MMOL/L — SIGNIFICANT CHANGE UP (ref 3.5–5.3)
POTASSIUM SERPL-SCNC: 4.8 MMOL/L — SIGNIFICANT CHANGE UP (ref 3.5–5.3)
PROT SERPL-MCNC: 7.8 G/DL — SIGNIFICANT CHANGE UP (ref 6–8.3)
SODIUM SERPL-SCNC: 137 MMOL/L — SIGNIFICANT CHANGE UP (ref 135–145)
TROPONIN T, HIGH SENSITIVITY RESULT: <6 NG/L — SIGNIFICANT CHANGE UP

## 2025-05-03 PROCEDURE — 71046 X-RAY EXAM CHEST 2 VIEWS: CPT | Mod: 26

## 2025-05-03 PROCEDURE — 93010 ELECTROCARDIOGRAM REPORT: CPT

## 2025-05-03 PROCEDURE — 70450 CT HEAD/BRAIN W/O DYE: CPT | Mod: 26

## 2025-05-03 PROCEDURE — 99284 EMERGENCY DEPT VISIT MOD MDM: CPT

## 2025-05-03 RX ORDER — METOCLOPRAMIDE HCL 10 MG
10 TABLET ORAL ONCE
Refills: 0 | Status: COMPLETED | OUTPATIENT
Start: 2025-05-03 | End: 2025-05-03

## 2025-05-03 RX ADMIN — Medication 10 MILLIGRAM(S): at 22:09

## 2025-05-03 RX ADMIN — Medication 2000 MILLILITER(S): at 22:09

## 2025-05-03 NOTE — ED ADULT TRIAGE NOTE - CHIEF COMPLAINT QUOTE
Pt c/o pain to rt side of face x 1 week, also endorsing rt sided headache. Was seen at urgent care and told she may have a sinus infection. also endorsing chest discomfort beginning tuesday. Denies pmhx.

## 2025-05-03 NOTE — ED PROVIDER NOTE - CLINICAL SUMMARY MEDICAL DECISION MAKING FREE TEXT BOX
38 y/o F  1. Right sided headache and facial pressure.  H/As occurring for more than a year.  Likely a primary headache syndrome such as migraine or cluster.  Consider sinus infection, although no fever or URI symptoms.  Will order labs and CTH to eval.  IVH and IV reglan.  Re-eval.  2. Card.  CP since Tuesday.  No CAD risk factors.  ECG reviewed.  Consider ACS.  Consider Ao dissection with H/A and chest pain together.  Patient without neuro deficits.  Will check ECG and CXR.  Troponin.  Will order CTA  if CXR shown signs of dissection.

## 2025-05-03 NOTE — ED PROVIDER NOTE - PATIENT PORTAL LINK FT
You can access the FollowMyHealth Patient Portal offered by [facility] by registering at the following website: [email or web address]. By joining [facility]’s FollowMyHealth portal, you will also be able to view your health information using other applications (apps) compatible with our system.

## 2025-05-03 NOTE — ED ADULT TRIAGE NOTE - INTERNATIONAL TRAVEL
Chief Complaint:  Patient is a 80y old  Male who presents with a chief complaint of Colitis (14 Dec 2021 12:44)      Interval Events / Subjective: Patient seen and evaluated at bedside, reporting no complaints, no overnight events. Patient is tolerating Low fiber diet. He reports 4-5 loose BMs  in a 24 hour period. Denies nausea, vomiting, abdominal pain, chest pain, shortness of breath, hematemesis, hematochezia, melena. Abdomen soft, non-tender, non-distended.       REVIEW OF SYSTEMS:   General: Negative  HEENT: Negative  CV: Negative  Respiratory: Negative  GI: See HPI  : Negative  MSK: Negative  Hematologic: Negative  Skin: Negative    MEDICATIONS:   MEDICATIONS  (STANDING):  amLODIPine   Tablet 5 milliGRAM(s) Oral daily  aspirin  chewable 81 milliGRAM(s) Oral daily  atorvastatin 20 milliGRAM(s) Oral at bedtime  heparin   Injectable 5000 Unit(s) SubCutaneous every 8 hours  influenza  Vaccine (HIGH DOSE) 0.7 milliLiter(s) IntraMuscular once  levothyroxine 25 MICROGram(s) Oral daily  lisinopril 40 milliGRAM(s) Oral daily  loratadine 10 milliGRAM(s) Oral daily  ranolazine 500 milliGRAM(s) Oral two times a day  saccharomyces boulardii 250 milliGRAM(s) Oral two times a day  vancomycin    Solution 125 milliGRAM(s) Oral every 6 hours    MEDICATIONS  (PRN):  acetaminophen     Tablet .. 650 milliGRAM(s) Oral every 6 hours PRN Temp greater or equal to 38C (100.4F), Mild Pain (1 - 3)  melatonin 3 milliGRAM(s) Oral at bedtime PRN Sleep      ALLERGIES:   Allergies    benzonatate (Rash)  Doxycycline Monohydrate (Rash)  predniSONE (Rash)    Intolerances        VITAL SIGNS:   Vital Signs Last 24 Hrs  T(C): 37.1 (15 Dec 2021 05:59), Max: 37.1 (14 Dec 2021 17:31)  T(F): 98.8 (15 Dec 2021 05:59), Max: 98.8 (14 Dec 2021 17:31)  HR: 74 (15 Dec 2021 05:59) (71 - 74)  BP: 110/61 (15 Dec 2021 05:59) (103/56 - 122/67)  BP(mean): --  RR: 18 (15 Dec 2021 05:59) (18 - 18)  SpO2: 94% (15 Dec 2021 05:59) (94% - 98%)  I&O's Summary      PHYSICAL EXAM:   GENERAL:  No acute distress  HEENT:  NC/AT,  conjunctivae clear, sclera -anicteric  CHEST:  Full & symmetric excursion, no increased effort, breath sounds clear  HEART:  Regular rhythm, S1, S2, no murmur/rub/S3/S4,  no edema  ABDOMEN:  Soft, non-tender, non-distended, normoactive bowel sounds,  no masses, no hepatosplenomegaly,   EXTREMITIES: No cyanosis, clubbing or edema  SKIN:  No rash/erythema/ecchymoses/petechiae/wounds/abscess/warm/dry  NEURO:  Alert, oriented    LABS:  CBC Full  -  ( 15 Dec 2021 07:32 )  WBC Count : 9.54 K/uL  RBC Count : 3.71 M/uL  Hemoglobin : 10.4 g/dL  Hematocrit : 32.7 %  Platelet Count - Automated : 305 K/uL  Mean Cell Volume : 88.1 fl  Mean Cell Hemoglobin : 28.0 pg  Mean Cell Hemoglobin Concentration : 31.8 gm/dL  Auto Neutrophil # : x  Auto Lymphocyte # : x  Auto Monocyte # : x  Auto Eosinophil # : x  Auto Basophil # : x  Auto Neutrophil % : x  Auto Lymphocyte % : x  Auto Monocyte % : x  Auto Eosinophil % : x  Auto Basophil % : x    12-15    141  |  108<H>  |  13.1  ----------------------------<  124<H>  4.6   |  25.0  |  1.00    Ca    7.9<L>      15 Dec 2021 07:32  Mg     1.8     12-14              GI PCR Panel, Stool (collected 12 Dec 2021 20:45)  Source: .Stool Feces  Final Report (12 Dec 2021 22:52):    GI PCR Results: NOT detected    *******Please Note:*******    GI panel PCR evaluates for:    Campylobacter, Plesiomonas shigelloides, Salmonella,    Vibrio, Yersinia enterocolitica, Enteroaggregative    Escherichia coli (EAEC), Enteropathogenic E.coli (EPEC),    Enterotoxigenic E. coli (ETEC) lt/st, Shiga-like    toxin-producing E. coli (STEC) stx1/stx2,    Shigella/ Enteroinvasive E. coli (EIEC), Cryptosporidium,    Cyclospora cayetanensis, Entamoeba histolytica,    Giardia lamblia, Adenovirus F 40/41, Astrovirus,    Norovirus GI/GII, Rotavirus A, Sapovirus    Culture - Stool (collected 12 Dec 2021 20:45)  Source: .Stool Feces  Final Report (14 Dec 2021 18:54):    No enteric pathogens isolated.    (Stool culture examined for Salmonella,    Shigella, Campylobacter, Aeromonas, Plesiomonas,    Vibrio, E.coli O157 and Yersinia)        RADIOLOGY & ADDITIONAL STUDIES (The following images were personally reviewed):    ACC: [id number] EXAM:  CT ABDOMEN AND PELVIS IC                          PROCEDURE DATE:  12/11/2021          INTERPRETATION:  CLINICAL INFORMATION: Abdominal pain and diarrhea    COMPARISON: CT abdomen pelvis 4/27/2021    CONTRAST/COMPLICATIONS:  IVContrast: Omnipaque 300  95 cc administered   5 cc discarded  Oral Contrast: NONE  Complications: None reported at time of study completion    PROCEDURE:  CT of the Abdomen and Pelvis was performed.  Sagittal and coronal reformats were performed.    FINDINGS:  LOWER CHEST: Emphysema. Left pleural thickening.    LIVER: Stable segment 5 cyst.  BILE DUCTS: Normal caliber.  GALLBLADDER: Cholelithiasis.  SPLEEN: Within normal limits.  PANCREAS: Within normal limits.  ADRENALS: Stable nodular thickening of the left adrenal gland. Right   adrenal gland within normal limits  KIDNEYS/URETERS: Left renal cyst. Symmetric renal enhancement without   hydronephrosis.    BLADDER: Within normal limits.  REPRODUCTIVE ORGANS: Prostate is enlarged.    BOWEL: Diverticulosis coli. Pancolonic wall thickening and pericolonic   inflammatory change. No bowel obstruction. Appendix is normal.  PERITONEUM: No ascites.  VESSELS: Atherosclerotic calcifications with stable chronic dissection in   the infrarenal abdominal aorta. Stable severe stenosis of the proximal   celiac artery.  RETROPERITONEUM/LYMPH NODES: No lymphadenopathy.  ABDOMINAL WALL: Small bilateral fat-containing inguinal hernias.  BONES: Degenerative changes.    IMPRESSION:  Pan colitis, likely infectious.        --- End of Report ---            SIM MEDRANO MD; Attending Radiologist  This document has been electronically signed. Dec 11 2021 10:05PM       Chief Complaint:  Patient is a 80y old  Male who presents with a chief complaint of Colitis.      Interval Events / Subjective: Patient seen and evaluated at bedside, reporting no complaints, no overnight events. Patient reports that overall he feels better. He reports 4-5 loose BMs yesterday and 2 this morning. Tolerating diet. Denies nausea, vomiting, abdominal pain, chest pain, shortness of breath, hematemesis, hematochezia, melena.       REVIEW OF SYSTEMS:   General: Negative  HEENT: Negative  CV: Negative  Respiratory: Negative  GI: See HPI  : Negative  MSK: Negative  Hematologic: Negative  Skin: Negative    MEDICATIONS:   MEDICATIONS  (STANDING):  amLODIPine   Tablet 5 milliGRAM(s) Oral daily  aspirin  chewable 81 milliGRAM(s) Oral daily  atorvastatin 20 milliGRAM(s) Oral at bedtime  heparin   Injectable 5000 Unit(s) SubCutaneous every 8 hours  levothyroxine 25 MICROGram(s) Oral daily  lisinopril 40 milliGRAM(s) Oral daily  loratadine 10 milliGRAM(s) Oral daily  ranolazine 500 milliGRAM(s) Oral two times a day  saccharomyces boulardii 250 milliGRAM(s) Oral two times a day  vancomycin    Solution 125 milliGRAM(s) Oral every 6 hours    MEDICATIONS  (PRN):  acetaminophen     Tablet .. 650 milliGRAM(s) Oral every 6 hours PRN Temp greater or equal to 38C (100.4F), Mild Pain (1 - 3)  melatonin 3 milliGRAM(s) Oral at bedtime PRN Sleep      ALLERGIES:   Allergies    benzonatate (Rash)  Doxycycline Monohydrate (Rash)  predniSONE (Rash)    Intolerances        VITAL SIGNS:   Vital Signs Last 24 Hrs  T(C): 36.7 (14 Dec 2021 11:49), Max: 37.4 (14 Dec 2021 04:16)  T(F): 98 (14 Dec 2021 11:49), Max: 99.3 (14 Dec 2021 04:16)  HR: 73 (14 Dec 2021 11:49) (73 - 83)  BP: 122/67 (14 Dec 2021 11:49) (122/67 - 137/72)  BP(mean): --  RR: 18 (14 Dec 2021 11:49) (17 - 18)  SpO2: 98% (14 Dec 2021 11:49) (96% - 98%)  I&O's Summary      PHYSICAL EXAM:   GENERAL:  Resting comfortable, No acute distress  HEENT:  NC/AT,  conjunctivae clear, sclera -anicteric  CHEST:  Full & symmetric excursion, no increased effort, breath sounds clear  HEART:  Regular rhythm, S1, S2, no murmur/rub/S3/S4,  no edema  ABDOMEN:  Soft, non-tender, non-distended, normoactive bowel sounds,  no masses, no hepatosplenomegaly,   EXTREMITIES: No cyanosis, clubbing or edema  SKIN:  No rash/erythema/ecchymoses/petechiae/wounds/abscess/warm/dry  NEURO:  Alert, oriented    LABS:  CBC Full  -  ( 13 Dec 2021 07:27 )  WBC Count : 22.98 K/uL  RBC Count : 3.95 M/uL  Hemoglobin : 11.4 g/dL  Hematocrit : 35.0 %  Platelet Count - Automated : 360 K/uL  Mean Cell Volume : 88.6 fl  Mean Cell Hemoglobin : 28.9 pg  Mean Cell Hemoglobin Concentration : 32.6 gm/dL  Auto Neutrophil # : x  Auto Lymphocyte # : x  Auto Monocyte # : x  Auto Eosinophil # : x  Auto Basophil # : x  Auto Neutrophil % : x  Auto Lymphocyte % : x  Auto Monocyte % : x  Auto Eosinophil % : x  Auto Basophil % : x    12-14    140  |  108<H>  |  9.4  ----------------------------<  125<H>  3.6   |  24.0  |  0.90    Ca    7.8<L>      14 Dec 2021 08:17  Mg     1.8     12-14              GI PCR Panel, Stool (collected 12 Dec 2021 20:45)  Source: .Stool Feces  Final Report (12 Dec 2021 22:52):    GI PCR Results: NOT detected    *******Please Note:*******    GI panel PCR evaluates for:    Campylobacter, Plesiomonas shigelloides, Salmonella,    Vibrio, Yersinia enterocolitica, Enteroaggregative    Escherichia coli (EAEC), Enteropathogenic E.coli (EPEC),    Enterotoxigenic E. coli (ETEC) lt/st, Shiga-like    toxin-producing E. coli (STEC) stx1/stx2,    Shigella/ Enteroinvasive E. coli (EIEC), Cryptosporidium,    Cyclospora cayetanensis, Entamoeba histolytica,    Giardia lamblia, Adenovirus F 40/41, Astrovirus,    Norovirus GI/GII, Rotavirus A, Sapovirus    Culture - Stool (collected 12 Dec 2021 20:45)  Source: .Stool Feces  Preliminary Report (13 Dec 2021 14:41):    No enteric pathogens to date: Final culture pending    Culture - Blood (collected 11 Dec 2021 15:24)  Source: .Blood Blood-Peripheral  Preliminary Report (13 Dec 2021 17:01):    No growth at 48 hours    Culture - Blood (collected 11 Dec 2021 15:24)  Source: .Blood Blood-Peripheral  Preliminary Report (13 Dec 2021 17:01):    No growth at 48 hours        RADIOLOGY & ADDITIONAL STUDIES (The following images were personally reviewed):    ACC: [id number] EXAM:  CT ABDOMEN AND PELVIS IC                          PROCEDURE DATE:  12/11/2021          INTERPRETATION:  CLINICAL INFORMATION: Abdominal pain and diarrhea    COMPARISON: CT abdomen pelvis 4/27/2021    CONTRAST/COMPLICATIONS:  IVContrast: Omnipaque 300  95 cc administered   5 cc discarded  Oral Contrast: NONE  Complications: None reported at time of study completion    PROCEDURE:  CT of the Abdomen and Pelvis was performed.  Sagittal and coronal reformats were performed.    FINDINGS:  LOWER CHEST: Emphysema. Left pleural thickening.    LIVER: Stable segment 5 cyst.  BILE DUCTS: Normal caliber.  GALLBLADDER: Cholelithiasis.  SPLEEN: Within normal limits.  PANCREAS: Within normal limits.  ADRENALS: Stable nodular thickening of the left adrenal gland. Right   adrenal gland within normal limits  KIDNEYS/URETERS: Left renal cyst. Symmetric renal enhancement without   hydronephrosis.    BLADDER: Within normal limits.  REPRODUCTIVE ORGANS: Prostate is enlarged.    BOWEL: Diverticulosis coli. Pancolonic wall thickening and pericolonic   inflammatory change. No bowel obstruction. Appendix is normal.  PERITONEUM: No ascites.  VESSELS: Atherosclerotic calcifications with stable chronic dissection in   the infrarenal abdominal aorta. Stable severe stenosis of the proximal   celiac artery.  RETROPERITONEUM/LYMPH NODES: No lymphadenopathy.  ABDOMINAL WALL: Small bilateral fat-containing inguinal hernias.  BONES: Degenerative changes.    IMPRESSION:  Pan colitis, likely infectious.        --- End of Report ---            SIM MEDRANO MD; Attending Radiologist  This document has been electronically signed. Dec 11 2021 10:05PM   HEALTH ISSUES - PROBLEM Dx:    C diff Colitis    INTERVAL HPI/OVERNIGHT EVENTS:    lying in bed  states everytime he has something by mouth he has watery stools  multiple episodes and unable to control to reach the bathroom  no nausea/ emesis    REVIEW OF SYSTEMS:    as above    Vital Signs Last 24 Hrs  T(C): 36.9 (13 Dec 2021 11:18), Max: 37 (12 Dec 2021 22:43)  T(F): 98.4 (13 Dec 2021 11:18), Max: 98.6 (12 Dec 2021 22:43)  HR: 79 (13 Dec 2021 11:18) (79 - 88)  BP: 120/64 (13 Dec 2021 11:18) (104/55 - 124/66)  BP(mean): --  RR: 18 (13 Dec 2021 11:18) (18 - 20)  SpO2: 97% (13 Dec 2021 11:18) (95% - 97%)    PHYSICAL EXAM-  GENERAL: Comfortable, no distress  HEAD:  Atraumatic, Normocephalic  EYES: EOMI, conjunctiva and sclera clear  ENT: Moist mucous membranes, No lesions  NECK: Supple, No JVD, Normal thyroid  NERVOUS SYSTEM:  Alert & Oriented X 3, Motor Strength 5/5 B/L upper and lower extremities  CHEST/LUNG: CTA bilaterally; No rales, rhonchi, wheezing, or rubs  HEART: Regular rate and rhythm; No murmurs, rubs, or gallops  ABDOMEN: Soft, Nontender, Nondistended; Bowel sounds present  EXTREMITIES:  2+ Peripheral Pulses, No clubbing, cyanosis, or edema  SKIN: No rashes or lesions    MEDICATIONS  (STANDING):  amLODIPine   Tablet 5 milliGRAM(s) Oral daily  aspirin  chewable 81 milliGRAM(s) Oral daily  atorvastatin 20 milliGRAM(s) Oral at bedtime  heparin   Injectable 5000 Unit(s) SubCutaneous every 8 hours  levothyroxine 25 MICROGram(s) Oral daily  lisinopril 40 milliGRAM(s) Oral daily  loratadine 10 milliGRAM(s) Oral daily  ranolazine 500 milliGRAM(s) Oral two times a day  saccharomyces boulardii 250 milliGRAM(s) Oral two times a day  sodium chloride 0.9%. 1000 milliLiter(s) (100 mL/Hr) IV Continuous <Continuous>  vancomycin    Solution 125 milliGRAM(s) Oral every 6 hours    MEDICATIONS  (PRN):  acetaminophen     Tablet .. 650 milliGRAM(s) Oral every 6 hours PRN Temp greater or equal to 38C (100.4F), Mild Pain (1 - 3)  melatonin 3 milliGRAM(s) Oral at bedtime PRN Sleep      LABS:                        11.4   22.98 )-----------( 360      ( 13 Dec 2021 07:27 )             35.0     12-13    139  |  106  |  16.1  ----------------------------<  136<H>  4.2   |  21.0<L>  |  0.97    Ca    7.8<L>      13 Dec 2021 07:27  Mg     1.8     12-13    TPro  5.7<L>  /  Alb  3.1<L>  /  TBili  0.3<L>  /  DBili  x   /  AST  13  /  ALT  11  /  AlkPhos  83  12-11    PT/INR - ( 11 Dec 2021 15:19 )   PT: 14.4 sec;   INR: 1.25 ratio         PTT - ( 11 Dec 2021 15:19 )  PTT:20.5 sec     HEALTH ISSUES - PROBLEM Dx:    C diff Colitis    INTERVAL HPI/OVERNIGHT EVENTS:    lying in bed  watery stools continues  no nausea/ emesis    REVIEW OF SYSTEMS:    as above    Vital Signs Last 24 Hrs  T(C): 36.7 (14 Dec 2021 11:49), Max: 37.4 (14 Dec 2021 04:16)  T(F): 98 (14 Dec 2021 11:49), Max: 99.3 (14 Dec 2021 04:16)  HR: 73 (14 Dec 2021 11:49) (73 - 83)  BP: 122/67 (14 Dec 2021 11:49) (122/67 - 137/72)  BP(mean): --  RR: 18 (14 Dec 2021 11:49) (17 - 18)  SpO2: 98% (14 Dec 2021 11:49) (96% - 98%)    PHYSICAL EXAM-  Constitutional: Resting comfortably,  in no acute distress.   Eyes: EOMI; PERRL; no drainage or redness  ENMT: No oral lesions; no gross abnormalities  Neck:	No bruits; no thyromegaly or nodules  Back:	No deformity or limitation of movement  Respiratory: Breath Sounds equal & clear to percussion & auscultation, no accessory muscle use  Cardiovascular: Regular rate & rhythm, normal S1, S2; no murmurs, gallops or rubs; no S3, S4  Gastrointestinal: Soft, non-tender, no hepatosplenomegaly, normal bowel sounds    MEDICATIONS  (STANDING):  amLODIPine   Tablet 5 milliGRAM(s) Oral daily  aspirin  chewable 81 milliGRAM(s) Oral daily  atorvastatin 20 milliGRAM(s) Oral at bedtime  heparin   Injectable 5000 Unit(s) SubCutaneous every 8 hours  levothyroxine 25 MICROGram(s) Oral daily  lisinopril 40 milliGRAM(s) Oral daily  loratadine 10 milliGRAM(s) Oral daily  ranolazine 500 milliGRAM(s) Oral two times a day  saccharomyces boulardii 250 milliGRAM(s) Oral two times a day  vancomycin    Solution 125 milliGRAM(s) Oral every 6 hours    MEDICATIONS  (PRN):  acetaminophen     Tablet .. 650 milliGRAM(s) Oral every 6 hours PRN Temp greater or equal to 38C (100.4F), Mild Pain (1 - 3)  melatonin 3 milliGRAM(s) Oral at bedtime PRN Sleep      LABS:                        11.4   22.98 )-----------( 360      ( 13 Dec 2021 07:27 )             35.0     12-14    140  |  108<H>  |  9.4  ----------------------------<  125<H>  3.6   |  24.0  |  0.90    Ca    7.8<L>      14 Dec 2021 08:17  Mg     1.8     12-14     HEALTH ISSUES - PROBLEM Dx:  CAD s/p stents, HTN, HLD, COPD, hypothyroidism     Diarrhea    INTERVAL HPI/ OVERNIGHT EVENTS:    comfortable  tolerating clears  discussed the need for further stool w/u    REVIEW OF SYSTEMS:    as above    Vital Signs Last 24 Hrs  T(C): 36.8 (12 Dec 2021 16:00), Max: 37.1 (12 Dec 2021 08:57)  T(F): 98.3 (12 Dec 2021 16:00), Max: 98.8 (12 Dec 2021 08:57)  HR: 82 (12 Dec 2021 16:00) (82 - 98)  BP: 113/61 (12 Dec 2021 16:00) (108/68 - 148/65)  BP(mean): --  RR: 20 (12 Dec 2021 16:00) (18 - 20)  SpO2: 95% (12 Dec 2021 16:00) (93% - 96%)    PHYSICAL EXAM-  GENERAL: Comfortable, no distress  HEAD:  Atraumatic, Normocephalic  EYES: EOMI, conjunctiva and sclera clear  ENT: Moist mucous membranes, No lesions  NECK: Supple, No JVD, Normal thyroid  NERVOUS SYSTEM:  Alert & Oriented X 3, Motor Strength 5/5 B/L upper and lower extremities  CHEST/LUNG: CTA bilaterally; No rales, rhonchi, wheezing, or rubs  HEART: Regular rate and rhythm; No murmurs, rubs, or gallops  ABDOMEN: Soft, Nontender, Nondistended; Bowel sounds present  EXTREMITIES:  2+ Peripheral Pulses, No clubbing, cyanosis, or edema  SKIN: No rashes or lesions    MEDICATIONS  (STANDING):  amLODIPine   Tablet 5 milliGRAM(s) Oral daily  aspirin  chewable 81 milliGRAM(s) Oral daily  atorvastatin 20 milliGRAM(s) Oral at bedtime  heparin   Injectable 5000 Unit(s) SubCutaneous every 8 hours  levothyroxine 25 MICROGram(s) Oral daily  lisinopril 40 milliGRAM(s) Oral daily  loratadine 10 milliGRAM(s) Oral daily  ranolazine 500 milliGRAM(s) Oral two times a day  saccharomyces boulardii 250 milliGRAM(s) Oral two times a day  sodium chloride 0.9%. 1000 milliLiter(s) (100 mL/Hr) IV Continuous <Continuous>  vancomycin    Solution 125 milliGRAM(s) Oral every 6 hours    MEDICATIONS  (PRN):  acetaminophen     Tablet .. 650 milliGRAM(s) Oral every 6 hours PRN Temp greater or equal to 38C (100.4F), Mild Pain (1 - 3)  melatonin 3 milliGRAM(s) Oral at bedtime PRN Sleep      LABS:                        12.3   38.09 )-----------( 394      ( 12 Dec 2021 05:42 )             39.0     12-12    141  |  106  |  27.5<H>  ----------------------------<  150<H>  4.6   |  23.0  |  1.18    Ca    8.4<L>      12 Dec 2021 05:42    TPro  5.7<L>  /  Alb  3.1<L>  /  TBili  0.3<L>  /  DBili  x   /  AST  13  /  ALT  11  /  AlkPhos  83  12-11    PT/INR - ( 11 Dec 2021 15:19 )   PT: 14.4 sec;   INR: 1.25 ratio       PTT - ( 11 Dec 2021 15:19 )  PTT:20.5 sec   Patient is a 80y old  Male who presents with a chief complaint of Colitis (12 Dec 2021 16:27)      INTERVAL HPI/OVERNIGHT EVENTS: Patient seen and evaluated at bedside, reporting uncontrol constant liquid non bloody stools. Tolerating clear liquid diet, on IV fluids in progress. Denies nausea, vomiting, abdominal pain, chest pain, shortness of breath, hematemesis, hematochezia, melena.                                                              MEDICATIONS  (STANDING):  amLODIPine   Tablet 5 milliGRAM(s) Oral daily  aspirin  chewable 81 milliGRAM(s) Oral daily  atorvastatin 20 milliGRAM(s) Oral at bedtime  heparin   Injectable 5000 Unit(s) SubCutaneous every 8 hours  levothyroxine 25 MICROGram(s) Oral daily  lisinopril 40 milliGRAM(s) Oral daily  loratadine 10 milliGRAM(s) Oral daily  ranolazine 500 milliGRAM(s) Oral two times a day  saccharomyces boulardii 250 milliGRAM(s) Oral two times a day  sodium chloride 0.9%. 1000 milliLiter(s) (100 mL/Hr) IV Continuous <Continuous>  vancomycin    Solution 125 milliGRAM(s) Oral every 6 hours    MEDICATIONS  (PRN):  acetaminophen     Tablet .. 650 milliGRAM(s) Oral every 6 hours PRN Temp greater or equal to 38C (100.4F), Mild Pain (1 - 3)  melatonin 3 milliGRAM(s) Oral at bedtime PRN Sleep      Allergies    benzonatate (Rash)  Doxycycline Monohydrate (Rash)  predniSONE (Rash)    Intolerances    Review of Systems:  · ENMT: negative  · Respiratory and Thorax: negative  · Cardiovascular: negative  · Gastrointestinal: see above.  · Genitourinary:	negative  · Musculoskeletal: negative  · Neurological: negative  · Psychiatric: negative  · Hematology/Lymphatics: negative  · Endocrine: negative      Vital Signs Last 24 Hrs  T(C): 36.9 (13 Dec 2021 04:44), Max: 37 (12 Dec 2021 22:43)  T(F): 98.4 (13 Dec 2021 04:44), Max: 98.6 (12 Dec 2021 22:43)  HR: 85 (13 Dec 2021 04:44) (82 - 88)  BP: 104/55 (13 Dec 2021 04:44) (104/55 - 124/66)  BP(mean): --  RR: 18 (13 Dec 2021 04:44) (18 - 20)  SpO2: 96% (13 Dec 2021 04:44) (93% - 97%)    PHYSICAL EXAM:  · Constitutional: Resting comfortably,  in no acute distress.   · Eyes: EOMI; PERRL; no drainage or redness  · ENMT: No oral lesions; no gross abnormalities  · Neck:	No bruits; no thyromegaly or nodules  · Back:	No deformity or limitation of movement  · Respiratory: Breath Sounds equal & clear to percussion & auscultation, no accessory muscle use  · Cardiovascular: Regular rate & rhythm, normal S1, S2; no murmurs, gallops or rubs; no S3, S4  · Gastrointestinal: Soft, non-tender, no hepatosplenomegaly, normal bowel sounds      LABS:                        11.4   22.98 )-----------( 360      ( 13 Dec 2021 07:27 )             35.0     12-13    139  |  106  |  16.1  ----------------------------<  136<H>  4.2   |  21.0<L>  |  0.97    Ca    7.8<L>      13 Dec 2021 07:27  Mg     1.8     12-13    TPro  5.7<L>  /  Alb  3.1<L>  /  TBili  0.3<L>  /  DBili  x   /  AST  13  /  ALT  11  /  AlkPhos  83  12-11    PT/INR - ( 11 Dec 2021 15:19 )   PT: 14.4 sec;   INR: 1.25 ratio         PTT - ( 11 Dec 2021 15:19 )  PTT:20.5 sec    LIVER FUNCTIONS - ( 11 Dec 2021 15:19 )  Alb: 3.1 g/dL / Pro: 5.7 g/dL / ALK PHOS: 83 U/L / ALT: 11 U/L / AST: 13 U/L / GGT: x             RADIOLOGY & ADDITIONAL TESTS:  < from: CT Abdomen and Pelvis w/ IV Cont (12.11.21 @ 21:26) >  ACC: [id number] EXAM:  CT ABDOMEN AND PELVIS IC                          PROCEDURE DATE:  12/11/2021          INTERPRETATION:  CLINICAL INFORMATION: Abdominal pain and diarrhea    COMPARISON: CT abdomen pelvis 4/27/2021    CONTRAST/COMPLICATIONS:  IVContrast: Omnipaque 300  95 cc administered   5 cc discarded  Oral Contrast: NONE  Complications: None reported at time of study completion    PROCEDURE:  CT of the Abdomen and Pelvis was performed.  Sagittal and coronal reformats were performed.    FINDINGS:  LOWER CHEST: Emphysema. Left pleural thickening.    LIVER: Stable segment 5 cyst.  BILE DUCTS: Normal caliber.  GALLBLADDER: Cholelithiasis.  SPLEEN: Within normal limits.  PANCREAS: Within normal limits.  ADRENALS: Stable nodular thickening of the left adrenal gland. Right   adrenal gland within normal limits  KIDNEYS/URETERS: Left renal cyst. Symmetric renal enhancement without   hydronephrosis.    BLADDER: Within normal limits.  REPRODUCTIVE ORGANS: Prostate is enlarged.    BOWEL: Diverticulosis coli. Pancolonic wall thickening and pericolonic   inflammatory change. No bowel obstruction. Appendix is normal.  PERITONEUM: No ascites.  VESSELS: Atherosclerotic calcifications with stable chronic dissection in   the infrarenal abdominal aorta. Stable severe stenosis of the proximal   celiac artery.  RETROPERITONEUM/LYMPH NODES: No lymphadenopathy.  ABDOMINAL WALL: Small bilateral fat-containing inguinal hernias.  BONES: Degenerative changes.    IMPRESSION:  Pan colitis, likely infectious.    < end of copied text >   No

## 2025-05-03 NOTE — ED ADULT NURSE NOTE - OBJECTIVE STATEMENT
Pt received to intake room 1, A&Ox4, ambulatory. Presenting to the ED today for a right sided headache x3 days similar to migraines she has had in the past. Pt endorsing light and sound sensitivity. Pt denies c/p, SOB, blurry vision, n/v/d, abd pain, or fever like symptoms. Respirations even and unlabored, NAD noted. 20G IV placed in the right AC, labs drawn and sent. Pt medicated as per MD orders. Comfort measures provided, safety maintained. Plan of care ongoing.

## 2025-05-03 NOTE — ED PROVIDER NOTE - PHYSICAL EXAMINATION
ATTENDING PHYSICAL EXAM  GEN - NAD; well appearing; A+O x3  HEAD - NC/AT; EYES/NOSE - PERRL, EOMI, mucous membranes moist, no discharge; THROAT: Oral cavity and pharynx normal. No inflammation, swelling, exudate, or lesions  NECK: Neck supple, non-tender without lymphadenopathy, no masses, no JVD  PULMONARY - CTA b/l, symmetric breath sounds, no w/r/r  CARDIAC -s1s2, RRR, no M,R,G  ABDOMEN - +NABS, ND, NT, soft, no guarding, no rebound, no masses   BACK - no CVA tenderness, No vertebral or paravertebral tenderness  EXTREMITIES - symmetric pulses, 2+ dp, capillary refill < 2 seconds, no clubbing, no cyanosis, no edema  SKIN - no rash or bruising   NEUROLOGIC - alert, CN 2-12 intact, no aphasia or dysarthria, sensation to light touch nl, coordination nl, romberg negative, motor 5/5 RUE/LUE/RLE/LLE/EHL/Plantar flexion, no pronator drift, no lateralizing features, gait nl

## 2025-05-03 NOTE — ED PROVIDER NOTE - NSFOLLOWUPINSTRUCTIONS_ED_ALL_ED_FT
No signs of emergency medical condition on today's workup.  Presumptive diagnosis made, but further evaluation may be required by your primary care doctor or specialist for a definitive diagnosis.  Therefore, follow up as directed and if symptoms change/worsen or any emergency conditions, please return to the ER.  Please follow up with your primary care doctor after you leave the emergency department so that they can follow up and conduct more testing and treatment as they deem necessary. If you have worsening signs or symptoms of what you came in to the Emergency Department today and are not able to see your doctor, go to your nearest emergency department or return to the [facility] emergency department for further care and management.    - Lab and imaging results, if performed, were discussed with you along with your discharge diagnosis    - Follow up with your doctor in 1 week - bring copies of your results if you were given. If you do not have a primary doctor, please call [phone] to find one convenient for you    - Return to the ED for any new, worsening, or concerning symptoms to you    - Continue all prescribed medications    - Take ibuprofen/tylenol as directed as needed for pain    - Rest and keep yourself hydrated with fluids    To control your pain at home, you should take Ibuprofen 400 mg along with Tylenol 650mg-1000mg every 6 to 8 hours. Limit your maximum daily Tylenol from all sources to 4000mg. Be aware that many other medications contain acetaminophen which is also known as Tylenol. Taking Tylenol and Ibuprofen together has been shown to be more effective at relieving pain than taking them separately. These are both over the counter medications that you can  at your local pharmacy without a prescription. You need to respect all of the warnings on the bottles. You shouldn’t take these medications for more than a week without following up with your doctor. Both medications come with certain risks and side effects that you need to discuss with your doctor, especially if you are taking them for a prolonged period.

## 2025-05-03 NOTE — ED PROVIDER NOTE - OBJECTIVE STATEMENT
38 y/o F c/o right sided headache and right facial pain/pressure x 1 week.  Reports has been having similar symptoms > 1 year.  PMD has referred her to a Neurologist but she has not yet seen.  Reports she went to  for this in beginning of January and again at end of February, and told it is likely a sinus infection, and treated with OTC migraine and decongestant medications and excedrin, never treated with abx.  Has not had CT done.  Also reports having SSCP since Tuesday, which is constantly present.  No known trauma.  No shortness of breath, nausea, vomiting, fever, cough, or recent illness.  Reports similar chest pain in June 2021 and diagnosed with PNA at that time.

## 2025-05-04 VITALS
RESPIRATION RATE: 17 BRPM | DIASTOLIC BLOOD PRESSURE: 62 MMHG | OXYGEN SATURATION: 100 % | TEMPERATURE: 98 F | HEART RATE: 73 BPM | SYSTOLIC BLOOD PRESSURE: 104 MMHG

## 2025-05-04 LAB
BASOPHILS # BLD AUTO: 0.04 K/UL — SIGNIFICANT CHANGE UP (ref 0–0.2)
BASOPHILS NFR BLD AUTO: 0.5 % — SIGNIFICANT CHANGE UP (ref 0–2)
EOSINOPHIL # BLD AUTO: 0.07 K/UL — SIGNIFICANT CHANGE UP (ref 0–0.5)
EOSINOPHIL NFR BLD AUTO: 0.9 % — SIGNIFICANT CHANGE UP (ref 0–6)
HCT VFR BLD CALC: 34.7 % — SIGNIFICANT CHANGE UP (ref 34.5–45)
HGB BLD-MCNC: 11.1 G/DL — LOW (ref 11.5–15.5)
IANC: 4 K/UL — SIGNIFICANT CHANGE UP (ref 1.8–7.4)
IMM GRANULOCYTES NFR BLD AUTO: 0.3 % — SIGNIFICANT CHANGE UP (ref 0–0.9)
LYMPHOCYTES # BLD AUTO: 2.9 K/UL — SIGNIFICANT CHANGE UP (ref 1–3.3)
LYMPHOCYTES # BLD AUTO: 38.2 % — SIGNIFICANT CHANGE UP (ref 13–44)
MCHC RBC-ENTMCNC: 29.1 PG — SIGNIFICANT CHANGE UP (ref 27–34)
MCHC RBC-ENTMCNC: 32 G/DL — SIGNIFICANT CHANGE UP (ref 32–36)
MCV RBC AUTO: 91.1 FL — SIGNIFICANT CHANGE UP (ref 80–100)
MONOCYTES # BLD AUTO: 0.57 K/UL — SIGNIFICANT CHANGE UP (ref 0–0.9)
MONOCYTES NFR BLD AUTO: 7.5 % — SIGNIFICANT CHANGE UP (ref 2–14)
NEUTROPHILS # BLD AUTO: 4 K/UL — SIGNIFICANT CHANGE UP (ref 1.8–7.4)
NEUTROPHILS NFR BLD AUTO: 52.6 % — SIGNIFICANT CHANGE UP (ref 43–77)
NRBC # BLD AUTO: 0 K/UL — SIGNIFICANT CHANGE UP (ref 0–0)
NRBC # FLD: 0 K/UL — SIGNIFICANT CHANGE UP (ref 0–0)
NRBC BLD AUTO-RTO: 0 /100 WBCS — SIGNIFICANT CHANGE UP (ref 0–0)
PLATELET # BLD AUTO: 285 K/UL — SIGNIFICANT CHANGE UP (ref 150–400)
RBC # BLD: 3.81 M/UL — SIGNIFICANT CHANGE UP (ref 3.8–5.2)
RBC # FLD: 12.9 % — SIGNIFICANT CHANGE UP (ref 10.3–14.5)
WBC # BLD: 7.6 K/UL — SIGNIFICANT CHANGE UP (ref 3.8–10.5)
WBC # FLD AUTO: 7.6 K/UL — SIGNIFICANT CHANGE UP (ref 3.8–10.5)

## 2025-05-04 RX ORDER — DOXYCYCLINE HYCLATE 100 MG
100 TABLET ORAL ONCE
Refills: 0 | Status: COMPLETED | OUTPATIENT
Start: 2025-05-04 | End: 2025-05-04

## 2025-05-04 RX ORDER — DOXYCYCLINE HYCLATE 100 MG
1 TABLET ORAL
Qty: 19 | Refills: 0
Start: 2025-05-04

## 2025-05-04 RX ADMIN — Medication 100 MILLIGRAM(S): at 01:26

## 2025-05-04 NOTE — ED ADULT NURSE REASSESSMENT NOTE - NS ED NURSE REASSESS COMMENT FT1
Pt at baseline mental status, resting in stretcher. Spontaneous respirations are even and unlabored on room air, no acute distress noted. Pt offers no complaints at this time. VS as noted. Awaiting dispo.

## 2025-06-26 ENCOUNTER — OFFICE (OUTPATIENT)
Dept: URBAN - METROPOLITAN AREA CLINIC 27 | Facility: CLINIC | Age: 40
Setting detail: OPHTHALMOLOGY
End: 2025-06-26
Payer: COMMERCIAL

## 2025-06-26 DIAGNOSIS — H40.013: ICD-10-CM

## 2025-06-26 DIAGNOSIS — H11.153: ICD-10-CM

## 2025-06-26 DIAGNOSIS — H10.45: ICD-10-CM

## 2025-06-26 DIAGNOSIS — H16.223: ICD-10-CM

## 2025-06-26 DIAGNOSIS — D31.40: ICD-10-CM

## 2025-06-26 PROCEDURE — 92133 CPTRZD OPH DX IMG PST SGM ON: CPT | Performed by: OPHTHALMOLOGY

## 2025-06-26 PROCEDURE — 92014 COMPRE OPH EXAM EST PT 1/>: CPT | Performed by: OPHTHALMOLOGY

## 2025-06-26 ASSESSMENT — KERATOMETRY
METHOD_AUTO_MANUAL: AUTO
OS_K2POWER_DIOPTERS: 44.25
OS_K1POWER_DIOPTERS: 44.25
OD_K1POWER_DIOPTERS: 44.25
OD_AXISANGLE_DEGREES: 90
OD_K2POWER_DIOPTERS: 44.25
OS_AXISANGLE_DEGREES: 90

## 2025-06-26 ASSESSMENT — REFRACTION_MANIFEST
OD_SPHERE: -1.00
OS_SPHERE: -1.75
OD_CYLINDER: +0.25
OS_CYLINDER: +0.25
OS_SPHERE: -1.50
OD_VA1: 20/15
OS_VA1: 20/15-2
OD_SPHERE: -1.50
OD_AXIS: 025
OS_AXIS: 135
OS_VA1: 20/15
OS_CYLINDER: +0.50
OS_AXIS: 115
OD_VA1: 20/15
OD_CYLINDER: SPHERE

## 2025-06-26 ASSESSMENT — REFRACTION_CURRENTRX
OS_CYLINDER: +0.75
OD_CYLINDER: SPHERE
OD_SPHERE: -1.00
OD_SPHERE: -0.75
OS_VPRISM_DIRECTION: SV
OD_AXIS: 023
OS_SPHERE: -1.00
OS_OVR_VA: 20/
OD_OVR_VA: 20/
OS_OVR_VA: 20/
OS_CYLINDER: +0.50
OD_VPRISM_DIRECTION: SV
OS_AXIS: 137
OD_OVR_VA: 20/
OS_AXIS: 135
OD_CYLINDER: +0.25
OS_SPHERE: -1.25

## 2025-06-26 ASSESSMENT — PACHYMETRY
OD_CT_CORRECTION: 4
OS_CT_CORRECTION: 4
OS_CT_UM: 484
OD_CT_UM: 481

## 2025-06-26 ASSESSMENT — VISUAL ACUITY
OD_BCVA: 20/20
OS_BCVA: 20/20

## 2025-06-26 ASSESSMENT — TONOMETRY
OS_IOP_MMHG: 16
OS_IOP_MMHG: 15
OD_IOP_MMHG: 12
OD_IOP_MMHG: 17

## 2025-06-26 ASSESSMENT — REFRACTION_AUTOREFRACTION
OS_AXIS: 156
OS_SPHERE: -0.50
OD_AXIS: 19
OD_AXIS: 13
OS_AXIS: 133
OS_CYLINDER: +0.75
OD_CYLINDER: +0.25
OD_SPHERE: -0.75
OS_CYLINDER: +0.75
OD_SPHERE: -0.25
OS_SPHERE: -1.00
OD_CYLINDER: +0.25

## 2025-06-26 ASSESSMENT — CONFRONTATIONAL VISUAL FIELD TEST (CVF)
OD_FINDINGS: FULL
OS_FINDINGS: FULL

## 2025-06-26 ASSESSMENT — SUPERFICIAL PUNCTATE KERATITIS (SPK)
OS_SPK: T
OD_SPK: T

## 2025-07-17 ENCOUNTER — APPOINTMENT (OUTPATIENT)
Dept: CT IMAGING | Facility: IMAGING CENTER | Age: 40
End: 2025-07-17

## 2025-07-17 ENCOUNTER — OUTPATIENT (OUTPATIENT)
Dept: OUTPATIENT SERVICES | Facility: HOSPITAL | Age: 40
LOS: 1 days | End: 2025-07-17

## 2025-07-17 ENCOUNTER — OUTPATIENT (OUTPATIENT)
Dept: OUTPATIENT SERVICES | Facility: HOSPITAL | Age: 40
LOS: 1 days | End: 2025-07-17
Payer: COMMERCIAL

## 2025-07-17 DIAGNOSIS — J32.0 CHRONIC MAXILLARY SINUSITIS: ICD-10-CM

## 2025-07-17 DIAGNOSIS — Z98.890 OTHER SPECIFIED POSTPROCEDURAL STATES: Chronic | ICD-10-CM

## 2025-07-17 DIAGNOSIS — Z00.8 ENCOUNTER FOR OTHER GENERAL EXAMINATION: ICD-10-CM

## 2025-07-17 PROCEDURE — 70486 CT MAXILLOFACIAL W/O DYE: CPT | Mod: 26

## 2025-07-17 PROCEDURE — 70486 CT MAXILLOFACIAL W/O DYE: CPT

## 2025-08-04 ENCOUNTER — APPOINTMENT (OUTPATIENT)
Dept: INTERNAL MEDICINE | Facility: CLINIC | Age: 40
End: 2025-08-04
Payer: COMMERCIAL

## 2025-08-04 VITALS
OXYGEN SATURATION: 98 % | HEART RATE: 68 BPM | DIASTOLIC BLOOD PRESSURE: 70 MMHG | HEIGHT: 68 IN | SYSTOLIC BLOOD PRESSURE: 114 MMHG | BODY MASS INDEX: 24.55 KG/M2 | WEIGHT: 162 LBS

## 2025-08-04 DIAGNOSIS — D64.9 ANEMIA, UNSPECIFIED: ICD-10-CM

## 2025-08-04 DIAGNOSIS — R73.03 PREDIABETES.: ICD-10-CM

## 2025-08-04 DIAGNOSIS — Z00.00 ENCOUNTER FOR GENERAL ADULT MEDICAL EXAMINATION W/OUT ABNORMAL FINDINGS: ICD-10-CM

## 2025-08-04 DIAGNOSIS — R53.83 OTHER FATIGUE: ICD-10-CM

## 2025-08-04 DIAGNOSIS — R42 DIZZINESS AND GIDDINESS: ICD-10-CM

## 2025-08-04 DIAGNOSIS — E78.2 MIXED HYPERLIPIDEMIA: ICD-10-CM

## 2025-08-04 PROCEDURE — 99396 PREV VISIT EST AGE 40-64: CPT

## 2025-08-04 PROCEDURE — 93000 ELECTROCARDIOGRAM COMPLETE: CPT

## 2025-08-04 PROCEDURE — 36415 COLL VENOUS BLD VENIPUNCTURE: CPT

## 2025-08-05 DIAGNOSIS — E55.9 VITAMIN D DEFICIENCY, UNSPECIFIED: ICD-10-CM

## 2025-08-05 LAB
25(OH)D3 SERPL-MCNC: 17.5 NG/ML
ALBUMIN SERPL ELPH-MCNC: 4.5 G/DL
ALP BLD-CCNC: 65 U/L
ALT SERPL-CCNC: 15 U/L
ANION GAP SERPL CALC-SCNC: 13 MMOL/L
AST SERPL-CCNC: 21 U/L
BASOPHILS # BLD AUTO: 0.05 K/UL
BASOPHILS NFR BLD AUTO: 1 %
BILIRUB SERPL-MCNC: 0.2 MG/DL
BUN SERPL-MCNC: 10 MG/DL
CALCIUM SERPL-MCNC: 9.3 MG/DL
CHLORIDE SERPL-SCNC: 103 MMOL/L
CHOLEST SERPL-MCNC: 209 MG/DL
CO2 SERPL-SCNC: 22 MMOL/L
CREAT SERPL-MCNC: 0.79 MG/DL
EGFRCR SERPLBLD CKD-EPI 2021: 96 ML/MIN/1.73M2
EOSINOPHIL # BLD AUTO: 0.34 K/UL
EOSINOPHIL NFR BLD AUTO: 6.9 %
ESTIMATED AVERAGE GLUCOSE: 114 MG/DL
FERRITIN SERPL-MCNC: 55 NG/ML
FOLATE SERPL-MCNC: 5.9 NG/ML
GLUCOSE SERPL-MCNC: 88 MG/DL
HBA1C MFR BLD HPLC: 5.6 %
HCT VFR BLD CALC: 38.5 %
HCV AB SER QL: NONREACTIVE
HCV S/CO RATIO: 0.2 S/CO
HDLC SERPL-MCNC: 45 MG/DL
HGB BLD-MCNC: 11.9 G/DL
HIV1+2 AB SPEC QL IA.RAPID: NONREACTIVE
IMM GRANULOCYTES NFR BLD AUTO: 0.2 %
IRON SATN MFR SERPL: 12 %
IRON SERPL-MCNC: 41 UG/DL
LDLC SERPL-MCNC: 136 MG/DL
LYMPHOCYTES # BLD AUTO: 2.8 K/UL
LYMPHOCYTES NFR BLD AUTO: 56.7 %
MAN DIFF?: NORMAL
MCHC RBC-ENTMCNC: 29 PG
MCHC RBC-ENTMCNC: 30.9 G/DL
MCV RBC AUTO: 93.9 FL
MONOCYTES # BLD AUTO: 0.44 K/UL
MONOCYTES NFR BLD AUTO: 8.9 %
NEUTROPHILS # BLD AUTO: 1.3 K/UL
NEUTROPHILS NFR BLD AUTO: 26.3 %
NONHDLC SERPL-MCNC: 164 MG/DL
PLATELET # BLD AUTO: 260 K/UL
POTASSIUM SERPL-SCNC: 3.9 MMOL/L
PROT SERPL-MCNC: 7.4 G/DL
RBC # BLD: 4.1 M/UL
RBC # FLD: 13.6 %
SODIUM SERPL-SCNC: 139 MMOL/L
T PALLIDUM AB SER QL IA: NEGATIVE
TIBC SERPL-MCNC: 351 UG/DL
TRIGL SERPL-MCNC: 157 MG/DL
TSH SERPL-ACNC: 1.27 UIU/ML
UIBC SERPL-MCNC: 310 UG/DL
VIT B12 SERPL-MCNC: 623 PG/ML
WBC # FLD AUTO: 4.94 K/UL

## 2025-08-05 RX ORDER — ERGOCALCIFEROL 1.25 MG/1
1.25 MG CAPSULE ORAL
Qty: 16 | Refills: 1 | Status: ACTIVE | COMMUNITY
Start: 2025-08-05 | End: 1900-01-01

## 2025-08-05 RX ORDER — ERGOCALCIFEROL 1.25 MG/1
1.25 MG CAPSULE ORAL
Qty: 12 | Refills: 1 | Status: ACTIVE | COMMUNITY
Start: 2025-08-05 | End: 1900-01-01

## 2025-08-06 ENCOUNTER — NON-APPOINTMENT (OUTPATIENT)
Age: 40
End: 2025-08-06

## 2025-08-25 ENCOUNTER — APPOINTMENT (OUTPATIENT)
Dept: INTERNAL MEDICINE | Facility: CLINIC | Age: 40
End: 2025-08-25

## 2025-08-27 ENCOUNTER — EMERGENCY (EMERGENCY)
Facility: HOSPITAL | Age: 40
LOS: 0 days | Discharge: HOME | End: 2025-08-27
Attending: STUDENT IN AN ORGANIZED HEALTH CARE EDUCATION/TRAINING PROGRAM
Payer: OTHER MISCELLANEOUS

## 2025-08-27 ENCOUNTER — APPOINTMENT (OUTPATIENT)
Dept: INTERNAL MEDICINE | Facility: CLINIC | Age: 40
End: 2025-08-27

## 2025-08-27 VITALS
WEIGHT: 162 LBS | SYSTOLIC BLOOD PRESSURE: 112 MMHG | DIASTOLIC BLOOD PRESSURE: 70 MMHG | HEART RATE: 91 BPM | HEIGHT: 68 IN | BODY MASS INDEX: 24.55 KG/M2 | OXYGEN SATURATION: 98 %

## 2025-08-27 VITALS — HEART RATE: 87 BPM | SYSTOLIC BLOOD PRESSURE: 101 MMHG | DIASTOLIC BLOOD PRESSURE: 66 MMHG | TEMPERATURE: 99 F

## 2025-08-27 VITALS
WEIGHT: 162.92 LBS | TEMPERATURE: 98 F | RESPIRATION RATE: 18 BRPM | SYSTOLIC BLOOD PRESSURE: 117 MMHG | HEART RATE: 73 BPM | DIASTOLIC BLOOD PRESSURE: 76 MMHG | OXYGEN SATURATION: 97 %

## 2025-08-27 DIAGNOSIS — Z98.890 OTHER SPECIFIED POSTPROCEDURAL STATES: Chronic | ICD-10-CM

## 2025-08-27 DIAGNOSIS — R60.0 LOCALIZED EDEMA: ICD-10-CM
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
## 2025-08-27 DIAGNOSIS — R53.1 WEAKNESS: ICD-10-CM

## 2025-08-27 DIAGNOSIS — R20.2 PARESTHESIA OF SKIN: ICD-10-CM

## 2025-08-27 DIAGNOSIS — M79.641 PAIN IN RIGHT HAND: ICD-10-CM
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
## 2025-08-27 DIAGNOSIS — Z88.0 ALLERGY STATUS TO PENICILLIN: ICD-10-CM

## 2025-08-27 PROCEDURE — 99284 EMERGENCY DEPT VISIT MOD MDM: CPT

## 2025-08-27 PROCEDURE — 73130 X-RAY EXAM OF HAND: CPT | Mod: 26,RT

## 2025-08-27 PROCEDURE — ZZZZZ: CPT

## (undated) DEVICE — DRSG TEGADERM 2.5X3"

## (undated) DEVICE — PREP BETADINE SPONGE STICKS

## (undated) DEVICE — SUT MONOCRYL 4-0 27" PS-2 UNDYED

## (undated) DEVICE — INSUFFLATION NDL COVIDIEN SURGINEEDLE VERESS 120MM

## (undated) DEVICE — UTERINE MANIPULATOR COOPER SURGICAL 5MM 33CM GREEN

## (undated) DEVICE — PACK PERI GYN

## (undated) DEVICE — POSITIONER STRAP ARMBOARD VELCRO TS-30

## (undated) DEVICE — GLV 7 PROTEXIS (WHITE)

## (undated) DEVICE — SOL IRR POUR NS 0.9% 500ML

## (undated) DEVICE — TUBING HYDRO-SURG PLUS IRRIGATOR W SMOKEVAC & PROBE

## (undated) DEVICE — DRSG TEGADERM 3.5X6"

## (undated) DEVICE — UTERINE MANIPULATOR CONMED VCARE MED 34MM

## (undated) DEVICE — SOL IRR POUR H2O 500ML

## (undated) DEVICE — TROCAR COVIDIEN VERSAONE BLADED FIXATION 11MM STANDARD

## (undated) DEVICE — DRSG PAD SANITARY OB

## (undated) DEVICE — TROCAR COVIDIEN VERSAPORT BLADELESS OPTICAL 5MM STANDARD

## (undated) DEVICE — SOL IRR BAG NS 0.9% 3000ML

## (undated) DEVICE — BASIN SET SINGLE

## (undated) DEVICE — TUBING TRUWAVE PRESSURE MALE/FEMALE 72"

## (undated) DEVICE — POSITIONER FOAM EGG CRATE ULNAR 2PCS (PINK)

## (undated) DEVICE — ENDOCATCH 10MM SPECIMEN POUCH

## (undated) DEVICE — BLADE SCALPEL SAFETYLOCK #15

## (undated) DEVICE — BLADE SURGICAL #15 CARBON

## (undated) DEVICE — TIP METZENBAUM SCISSOR MONOPOLAR ENDOCUT (ORANGE)

## (undated) DEVICE — APPLICATOR FOR ARISTA XL 38CM

## (undated) DEVICE — SUT VICRYL 0 27" UR-6

## (undated) DEVICE — TROCAR COVIDIEN VERSAONE BLUNT TIP HASSAN 12MM

## (undated) DEVICE — GLV 6.5 PROTEXIS (CREAM) MICRO

## (undated) DEVICE — DRSG TELFA 2 X 3

## (undated) DEVICE — PROTECTOR HEEL / ELBOW FLUFFY

## (undated) DEVICE — DRSG DERMABOND MINI

## (undated) DEVICE — TUBING OLYMPUS INSUFFLATION

## (undated) DEVICE — FOLEY TRAY 16FR 5CC LF UMETER CLOSED

## (undated) DEVICE — LIGASURE BLUNT TIP 37CM

## (undated) DEVICE — VENODYNE/SCD SLEEVE CALF MEDIUM

## (undated) DEVICE — PACK GENERAL LAPAROSCOPY

## (undated) DEVICE — SYR LUER LOK 50CC

## (undated) DEVICE — DRSG TELFA 3 X 8

## (undated) DEVICE — DRSG STERISTRIPS 0.5 X 4"

## (undated) DEVICE — DRSG KERLIX ROLL 4.5"

## (undated) DEVICE — TUBING STRYKEFLOW II SUCTION / IRRIGATOR